# Patient Record
Sex: FEMALE | NOT HISPANIC OR LATINO | Employment: PART TIME | URBAN - METROPOLITAN AREA
[De-identification: names, ages, dates, MRNs, and addresses within clinical notes are randomized per-mention and may not be internally consistent; named-entity substitution may affect disease eponyms.]

---

## 2021-11-10 ENCOUNTER — MEDICAL CORRESPONDENCE (OUTPATIENT)
Dept: HEALTH INFORMATION MANAGEMENT | Facility: CLINIC | Age: 36
End: 2021-11-10

## 2021-11-10 ENCOUNTER — HOSPITAL ENCOUNTER (OUTPATIENT)
Facility: CLINIC | Age: 36
Setting detail: OBSERVATION
Discharge: OTHER INSTITUTION | End: 2021-11-12
Attending: EMERGENCY MEDICINE | Admitting: EMERGENCY MEDICINE

## 2021-11-10 DIAGNOSIS — F12.20 CANNABIS DEPENDENCE (H): ICD-10-CM

## 2021-11-10 DIAGNOSIS — R45.851 SUICIDAL IDEATION: ICD-10-CM

## 2021-11-10 DIAGNOSIS — N30.00 ACUTE CYSTITIS WITHOUT HEMATURIA: ICD-10-CM

## 2021-11-10 DIAGNOSIS — F15.90 STIMULANT USE DISORDER: ICD-10-CM

## 2021-11-10 DIAGNOSIS — F20.0 PARANOID SCHIZOPHRENIA (H): Primary | ICD-10-CM

## 2021-11-10 LAB
ALBUMIN UR-MCNC: NEGATIVE MG/DL
AMPHETAMINES UR QL SCN: ABNORMAL
APPEARANCE UR: CLEAR
BARBITURATES UR QL: ABNORMAL
BENZODIAZ UR QL: ABNORMAL
BILIRUB UR QL STRIP: NEGATIVE
CANNABINOIDS UR QL SCN: ABNORMAL
COCAINE UR QL: ABNORMAL
COLOR UR AUTO: ABNORMAL
GLUCOSE UR STRIP-MCNC: NEGATIVE MG/DL
HCG UR QL: NEGATIVE
HGB UR QL STRIP: NEGATIVE
KETONES UR STRIP-MCNC: NEGATIVE MG/DL
LEUKOCYTE ESTERASE UR QL STRIP: ABNORMAL
MUCOUS THREADS #/AREA URNS LPF: PRESENT /LPF
NITRATE UR QL: NEGATIVE
OPIATES UR QL SCN: ABNORMAL
PCP UR QL SCN: ABNORMAL
PH UR STRIP: 6 [PH] (ref 5–7)
RBC URINE: 1 /HPF
SARS-COV-2 RNA RESP QL NAA+PROBE: NEGATIVE
SP GR UR STRIP: 1.03 (ref 1–1.03)
SQUAMOUS EPITHELIAL: 2 /HPF
UROBILINOGEN UR STRIP-MCNC: NORMAL MG/DL
WBC URINE: 36 /HPF

## 2021-11-10 PROCEDURE — 80307 DRUG TEST PRSMV CHEM ANLYZR: CPT | Performed by: EMERGENCY MEDICINE

## 2021-11-10 PROCEDURE — 87635 SARS-COV-2 COVID-19 AMP PRB: CPT | Performed by: EMERGENCY MEDICINE

## 2021-11-10 PROCEDURE — 99285 EMERGENCY DEPT VISIT HI MDM: CPT | Mod: 25

## 2021-11-10 PROCEDURE — 87086 URINE CULTURE/COLONY COUNT: CPT | Performed by: EMERGENCY MEDICINE

## 2021-11-10 PROCEDURE — C9803 HOPD COVID-19 SPEC COLLECT: HCPCS

## 2021-11-10 PROCEDURE — 250N000013 HC RX MED GY IP 250 OP 250 PS 637: Performed by: EMERGENCY MEDICINE

## 2021-11-10 PROCEDURE — 81001 URINALYSIS AUTO W/SCOPE: CPT | Performed by: EMERGENCY MEDICINE

## 2021-11-10 PROCEDURE — 90791 PSYCH DIAGNOSTIC EVALUATION: CPT

## 2021-11-10 PROCEDURE — 81025 URINE PREGNANCY TEST: CPT | Performed by: EMERGENCY MEDICINE

## 2021-11-10 RX ORDER — CEPHALEXIN 500 MG/1
500 CAPSULE ORAL 2 TIMES DAILY
Status: DISCONTINUED | OUTPATIENT
Start: 2021-11-10 | End: 2021-11-12 | Stop reason: HOSPADM

## 2021-11-10 RX ORDER — CEPHALEXIN 500 MG/1
500 CAPSULE ORAL ONCE
Status: COMPLETED | OUTPATIENT
Start: 2021-11-10 | End: 2021-11-10

## 2021-11-10 RX ORDER — OLANZAPINE 5 MG/1
5 TABLET, ORALLY DISINTEGRATING ORAL ONCE
Status: COMPLETED | OUTPATIENT
Start: 2021-11-10 | End: 2021-11-10

## 2021-11-10 RX ORDER — OLANZAPINE 5 MG/1
5 TABLET, ORALLY DISINTEGRATING ORAL EVERY 8 HOURS PRN
Status: DISCONTINUED | OUTPATIENT
Start: 2021-11-10 | End: 2021-11-10

## 2021-11-10 RX ADMIN — CEPHALEXIN 500 MG: 500 CAPSULE ORAL at 20:58

## 2021-11-10 RX ADMIN — OLANZAPINE 5 MG: 5 TABLET, ORALLY DISINTEGRATING ORAL at 20:58

## 2021-11-10 RX ADMIN — CEPHALEXIN 500 MG: 500 CAPSULE ORAL at 22:58

## 2021-11-10 ASSESSMENT — ENCOUNTER SYMPTOMS: HALLUCINATIONS: 1

## 2021-11-10 ASSESSMENT — MIFFLIN-ST. JEOR: SCORE: 1462.48

## 2021-11-10 NOTE — ED PROVIDER NOTES
History   Chief Complaint:  Psychiatric Evaluation and Suicidal       The history is provided by the patient.      Kylie Meier is a 35 year old female with history of bipolar disorder and schizophrenia who presents with hallucinations and suicidal ideations. The patient states she was just discharged from a hospital after being there for 3 days. She notes that while she was there she was being treated for a possible bladder infection but did not feel safe there. She also states she was sleeping on the floor and everyone was looking at her weird. After the hospital discharged her, she called the police because she did not feel safe and had been unable to get in contact with her family. For the past month, she has been off her medications as she thought she would be okay without them; however, she feels she has been progressively worsening. While in the ED, she states she sees dark shadowy figures and hears voices in her head. She also notes she has been feeling suicidal and does not feel safe being alone.     Review of Systems   Genitourinary: Positive for pelvic pain (bladder).   Psychiatric/Behavioral: Positive for hallucinations and suicidal ideas.   All other systems reviewed and are negative.      Allergies:  The patient has no known allergies.     Medications:  Trazodone   Depakote     Past Medical History:     Bipolar disorder  Schizophrenia    Past Surgical History:    The patient has no listed past surgical history.     Family History:    The patient has no listed family history.     Social History:  The patient presents to the ED alone. She lives with her mother and moved to [place] one year and four months ago.     Physical Exam     Patient Vitals for the past 24 hrs:   BP Temp Temp src Pulse Resp SpO2   11/10/21 1652 126/73 -- -- -- -- --   11/10/21 1650 -- 98.1  F (36.7  C) Temporal 100 18 100 %       Physical Exam  Eye:  Pupils are equal, round, and reactive.  Extraocular movements intact.    ENT:  No  "rhinorrhea.  Moist mucus membranes.  Normal tongue and tonsil.    Cardiac:  Regular rate and rhythm.  No murmurs, gallops, or rubs.    Pulmonary:  Clear to auscultation bilaterally.  No wheezes, rales, or rhonchi.    Abdomen:  Positive bowel sounds.  Abdomen is soft and non-distended, without focal tenderness.    Musculoskeletal:  Normal movement of all extremities without evidence for deficit.    Skin:  Warm and dry without rashes.    Neurologic:  Non-focal exam without asymmetric weakness or numbness.     Psychiatric:  Patient is paranoid, frequently looking out the window with fears of being followed. She describes dark shadows in the corners. She admits to mental health issues and non compliance with meds. She admits to being suicidal.       Emergency Department Course   Laboratory:  UA with microscopic: small leukocyte esterase, mucus present, WBC 36 (H), squamous epithelial 2 (H) o/w WNL     Drug abuse screen 77 urine: positive for amphetamine, cannabinoids, and cocaine o/w all negative      HCG qualitative urine: negative     Asymptomatic COVID19 Virus PCR by nasopharyngeal swab: negative    Emergency Department Course:  Reviewed:  I reviewed nursing notes, vitals, past medical history, Care Everywhere and [facility]    Assessments:  1828 I obtained history and examined the patient as noted above.     1940 I rechecked the patient and explained findings.     Interventions:  2058 Keflex 500 mg PO   Zyprexa 5 mg PO    Disposition:  The patient was transferred to [facility].     Impression & Plan     Medical Decision Making:  This 35-year-old woman presents to us showing signs of paranoia and agitation along with voicing concerns of suicidality.  The patient states that she has been in the [place] for greater than a year and has undergone prior admissions for \"paranoid schizophrenia\" and \"bipolar\" though I cannot find any evidence of visits under her name in care everywhere.  She denies any medical concerns today.  She " "notes that she is supposed to be taking Depakote and trazodone, though she has not been taking these for over a year.  When I mention to Zyprexa she notes that she has been prescribed this in the past with good results.    On my initial evaluation, the patient is having visual hallucinations, seeing \"dark shadows in the corners\" and having paranoia and believing that other patients outside of her room have been sent here to watch her and stalked her.  However, she is redirectable and is calm and polite with me, asking for help to get started back on her medications and to be kept in a safe location.  She tells me that she was at a prior hospital today and does have a prescription for Keflex in her possession, though it is unclear which hospital she was at and she states that \"they just kicked me out.\"  I obtained another urine here which does show a mild infection and her Keflex was continued.  She also agreed to take a dose of Zyprexa with her loose Nations and paranoia.  U tox is positive for methamphetamines and cocaine and it is unclear how much of her symptoms today are related to illicit drug use.  Nonetheless, she is inappropriate to be discharged to the street at this time.  With her negative Covid test, I feel that she is not an ideal candidate for the EMPATH unit where she can be allowed to sober, reinitiate medications, and determine a long-term plan for her psychiatric conditions.  I do not feel she requires further medical work-up at this juncture.    Diagnosis:    ICD-10-CM    1. Paranoia (H)  F22    2. Acute cystitis without hematuria  N30.00    3. Polysubstance abuse (H)  F19.10          Scribe Disclosure:  I, Pauline Crowder, am serving as a scribe at 5:08 PM on 11/10/2021 to document services personally performed by Trierweiler, Chad A, MD based on my observations and the provider's statements to me.              Trierweiler, Chad A, MD  11/10/21 2999    "
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
## 2021-11-10 NOTE — ED TRIAGE NOTES
Pt reports feeling suicidal with plan to jump into traffic. Pt also reports seeing things and hearing things. Patient reports she is seeing demons. Pt brought to ED by police. Pt seen at [facility] today and diagnosed with UTI, has not filled her keflex prescription yet, requesting a dose here due to bladder discomfort.

## 2021-11-11 PROBLEM — F20.0 PARANOID SCHIZOPHRENIA (H): Status: ACTIVE | Noted: 2021-11-11

## 2021-11-11 PROBLEM — N30.00 ACUTE CYSTITIS WITHOUT HEMATURIA: Status: ACTIVE | Noted: 2021-11-11

## 2021-11-11 PROBLEM — F12.20 CANNABIS DEPENDENCE (H): Status: ACTIVE | Noted: 2021-11-11

## 2021-11-11 PROBLEM — R45.851 SUICIDAL IDEATION: Status: ACTIVE | Noted: 2021-11-11

## 2021-11-11 PROBLEM — F15.90 STIMULANT USE DISORDER: Status: ACTIVE | Noted: 2021-11-11

## 2021-11-11 PROCEDURE — G0378 HOSPITAL OBSERVATION PER HR: HCPCS

## 2021-11-11 PROCEDURE — 99220 PR INITIAL OBSERVATION CARE,LEVEL III: CPT | Mod: GC | Performed by: PSYCHIATRY & NEUROLOGY

## 2021-11-11 PROCEDURE — 250N000013 HC RX MED GY IP 250 OP 250 PS 637: Performed by: PSYCHIATRY & NEUROLOGY

## 2021-11-11 PROCEDURE — 250N000013 HC RX MED GY IP 250 OP 250 PS 637: Performed by: EMERGENCY MEDICINE

## 2021-11-11 RX ORDER — HYDROXYZINE HYDROCHLORIDE 50 MG/1
50 TABLET, FILM COATED ORAL 3 TIMES DAILY PRN
Status: DISCONTINUED | OUTPATIENT
Start: 2021-11-11 | End: 2021-11-12 | Stop reason: HOSPADM

## 2021-11-11 RX ORDER — OLANZAPINE 5 MG/1
5 TABLET, ORALLY DISINTEGRATING ORAL EVERY 6 HOURS PRN
Status: DISCONTINUED | OUTPATIENT
Start: 2021-11-11 | End: 2021-11-11

## 2021-11-11 RX ORDER — TRAZODONE HYDROCHLORIDE 100 MG/1
100 TABLET ORAL
Status: DISCONTINUED | OUTPATIENT
Start: 2021-11-11 | End: 2021-11-12 | Stop reason: HOSPADM

## 2021-11-11 RX ORDER — OLANZAPINE 7.5 MG/1
15 TABLET, FILM COATED ORAL ONCE
Status: COMPLETED | OUTPATIENT
Start: 2021-11-11 | End: 2021-11-11

## 2021-11-11 RX ORDER — DIVALPROEX SODIUM 500 MG/1
1000 TABLET, EXTENDED RELEASE ORAL ONCE
Status: COMPLETED | OUTPATIENT
Start: 2021-11-11 | End: 2021-11-11

## 2021-11-11 RX ORDER — OLANZAPINE 10 MG/1
10 TABLET, ORALLY DISINTEGRATING ORAL EVERY 6 HOURS PRN
Status: DISCONTINUED | OUTPATIENT
Start: 2021-11-11 | End: 2021-11-12 | Stop reason: HOSPADM

## 2021-11-11 RX ORDER — ACETAMINOPHEN 500 MG
500 TABLET ORAL EVERY 6 HOURS PRN
Status: DISCONTINUED | OUTPATIENT
Start: 2021-11-11 | End: 2021-11-12 | Stop reason: HOSPADM

## 2021-11-11 RX ADMIN — DIVALPROEX SODIUM 1000 MG: 500 TABLET, FILM COATED, EXTENDED RELEASE ORAL at 14:57

## 2021-11-11 RX ADMIN — OLANZAPINE 5 MG: 5 TABLET, ORALLY DISINTEGRATING ORAL at 13:50

## 2021-11-11 RX ADMIN — CEPHALEXIN 500 MG: 500 CAPSULE ORAL at 09:42

## 2021-11-11 RX ADMIN — CEPHALEXIN 500 MG: 500 CAPSULE ORAL at 21:08

## 2021-11-11 RX ADMIN — OLANZAPINE 15 MG: 7.5 TABLET, FILM COATED ORAL at 14:57

## 2021-11-11 RX ADMIN — ACETAMINOPHEN 500 MG: 500 TABLET, FILM COATED ORAL at 14:57

## 2021-11-11 ASSESSMENT — ACTIVITIES OF DAILY LIVING (ADL): HYGIENE/GROOMING: INDEPENDENT

## 2021-11-11 NOTE — ED PROVIDER NOTES
EmPATH Unit - Initial Psychiatric Observation Note  [facility] Emergency Department  Observation Initiation Date: Nov 10, 2021    Kylie Meier MRN: [id number]   Age: 35 year old YOB: 1985     History     Chief Complaint   Patient presents with     Psychiatric Evaluation     Suicidal     HPI  Kylie Meier is a 35 year old female with a past history notable for schizophrenia and substance use disorders who presented to the emergency room reporting worsening hallucinations and suicidal ideation.  Her urine drug screen was positive for cocaine, amphetamines, and cannabis.  She reported nonadherence with her psychotropic medications for approximately 1 month.  She was noted to also have a urinary tract infection and was continued on Keflex.  She was transferred to the empath unit for psychiatric assessment.  On examination, the patient confirmed nonadherence with her medications over the past 1 month as she thought she no longer needed them.  She suspects she was taking a combination of Depakote, Risperdal, Zyprexa, and trazodone however was not certain.  She endorsed recent substance usage, primarily discussing using K2 which resulted in significant worsening of auditory and visual hallucinations.  She also noted significant paranoid delusions leading to a state of agitation and wanting to confront random people in the community.  As she did not desire the conflict, she contemplated suicide however came to the emergency room to seek help instead.  She was able to contract for safety on the unit.  She is open to the treatment plan as outlined below.    Past Medical History  History reviewed. No pertinent past medical history.  History reviewed. No pertinent surgical history.  No current outpatient medications on file.    Allergies   Allergen Reactions     Iodine Rash     Family History  History reviewed. No pertinent family history.  Social History   Social History     Tobacco Use     Smoking  "status: Current Every Day Smoker     Smokeless tobacco: Never Used   Substance Use Topics     Alcohol use: Not Currently     Drug use: Yes     Types: Marijuana      Past medical history, past surgical history, medications, allergies, family history, and social history were reviewed with the patient. No additional pertinent items.       Review of Systems  A complete review of systems was performed with pertinent positives and negatives noted in the HPI, and all other systems negative.    Physical Examination   BP: 126/73  Pulse: 100  Temp: 98.1  F (36.7  C)  Resp: 18  Height: 160 cm (5' 3\")  Weight: 78.8 kg (173 lb 12.8 oz)  SpO2: 100 %    Physical Exam  General: Appears stated age.   Neuro: Alert and fully oriented. Extremities appear to demonstrate normal strength on visual inspection.   Integumentary/Skin: no rash visualized, normal color    Psychiatric Examination   Appearance: dressed in hospital scrubs and fatigued  Attitude:  guarded and somewhat cooperative  Eye Contact:  poor   Mood:  angry, anxious and sad   Affect:  labile and guarded  Speech:  clear, coherent  Psychomotor Behavior:  Rocking back and forth while laying on a mat on the floor.  Thought Process:  linear  Associations:  no loose associations  Thought Content:  active suicidal ideation present, auditory hallucinations present and Paranoid delusions noted  Insight:  partial  Judgement:  fair  Oriented to:  time, person, and place  Attention Span and Concentration:  limited  Recent and Remote Memory:  fair  Language: able to name/identify objects without impairment  Fund of Knowledge: intact with awareness of current and past events    ED Course        Labs Ordered and Resulted from Time of ED Arrival to Time of ED Departure   ROUTINE UA WITH MICROSCOPIC REFLEX TO CULTURE - Abnormal       Result Value    Color Urine Light Yellow      Appearance Urine Clear      Glucose Urine Negative      Bilirubin Urine Negative      Ketones Urine Negative      " Specific Gravity Urine 1.029      Blood Urine Negative      pH Urine 6.0      Protein Albumin Urine Negative      Urobilinogen Urine Normal      Nitrite Urine Negative      Leukocyte Esterase Urine Small (*)     Mucus Urine Present (*)     RBC Urine 1      WBC Urine 36 (*)     Squamous Epithelials Urine 2 (*)    DRUG ABUSE SCREEN 77 URINE (FL, RH, SH) - Abnormal    Amphetamines Urine Screen Positive (*)     Barbiturates Urine Screen Negative      Benzodiazepines Urine Screen Negative      Cannabinoids Urine Screen Positive (*)     Cocaine Urine Screen Positive (*)     Opiates Urine Screen Negative      PCP Urine Screen Negative     COVID-19 VIRUS (CORONAVIRUS) BY PCR - Normal    SARS CoV2 PCR Negative     HCG QUALITATIVE URINE - Normal    hCG Urine Qualitative Negative     URINE CULTURE    Culture Culture in progress         Assessments & Plan (with Medical Decision Making)   Patient presenting with decompensation of her psychotic illness in the setting of illicit substance usage and nonadherence with medications leading to increased psychosis and suicidal thoughts. Nursing notes reviewed noting no acute issues.     I have reviewed the assessment completed by the [facility].     During the observation period, the patient did not require medications for agitation, and did not require restraints/seclusion for patient and/or provider safety.     The patient was found to have a psychiatric condition that would benefit from an observation stay in the emergency department for further psychiatric stabilization and/or coordination of a safe disposition. The observation plan includes serial assessments of psychiatric condition, potential administration of medications if indicated, further disposition pending the patient's psychiatric course during the monitoring period.     Preliminary diagnosis:    ICD-10-CM    1. Paranoid schizophrenia (H)  F20.0    2. Acute cystitis without hematuria  N30.00    3. Cannabis dependence (H)   F12.20    4. Stimulant use disorder  F15.90    5. Suicidal ideation  R45.851         Treatment Plan:  -Begin a combination of Zyprexa and Depakote targeting affective instability and psychosis.  Depakote will be initiated at 1000 mg daily in addition to Zyprexa 15 mg at bedtime.  Risks and benefits of her medication were reviewed.  -Trazodone will be available as needed for insomnia.  Vistaril will be available as needed for anxiety.  -Labs were reviewed including a urine drug screen that was positive for cocaine, amphetamines, and cannabis.  -Keflex has been continued for treatment of UTI  -Enter to observation status and plan to reassess tomorrow.    --  Alfredo Jennings MD   [facility] EMERGENCY DEPT  EmPATH Unit  11/10/2021        Alfredo Jennings MD  11/11/21 1500       Alfredo Jennings MD  11/11/21 6142
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
## 2021-11-11 NOTE — ED NOTES
Pt in sensory room resting on mat. She is pleasant and cooperative when interacting with staff. Pt talked to staff about seeing aliens. Pt med compliant. She reports pain and odor when urinating secondary to her urinary infection.

## 2021-11-11 NOTE — PLAN OF CARE
"Pt received from ED for hallucinations and suicidal ideation. On EmPATH, Pt reports she has been suicidal and was having suicidal thoughts today. She states her plan is \"to let the evil spirits and people who are going to kill me kill me.\" To jump off the building.Pt reports previous suicide attempts but could not provide any details. She reports hearing voices telling her \"Don't trust, be aware.\" Pt reports she used to take Depakote, Trazodone and Celexa but has not done so in a month. Pt could not provide details about medication dosages, her doctor or clinic she went to. Pt reports yesterday she smoked marijuana and thinks it was laced by cocaine and possibly meth. She does not admit to using cocaine or amphetamines. Pt presents as paranoid and inquired about the cartels and whether the unit was safe.     Nursing and risk assessments completed.  Assessments reviewed with [facility] and physician. Video monitoring in progress, patient informed.  Admission information reviewed with patient. Patient given a tour of EmPATH and instructions on using the facility. Questions regarding EmPATH addressed. Pt search completed and belongings inventoried.   "
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
## 2021-11-11 NOTE — ED NOTES
The pt took the antibiotic late after having breakfast and feeling less nauseated.   The pt states that she needs to hide under the covers because of the ghosts. She states that she knows that they're there because they have been changing the lights in the room.

## 2021-11-11 NOTE — TREATMENT PLAN
"  [facility] Treatment Plan     Client's Name: \"Tray\" Kylie Meier                    YOB: 1985     DSM-5 Diagnoses:     F20.9   Schizophrenia, by history                     F31.9 Unspecified bipolar and related disorder, by history            Psychosocial / Contextual Factors:      Patient is a 35 year old,  female ( uses she/her pronouns, but she does not identify as feminine and would like to be called \"Tray\") who presents to the ED for concerns of hallucinations, suicidal ideations, and paranoia.     Pt presents with a depressed mood and disorganized thinking and speech, frequently derailing with paranoid comments. Pt appears paranoid about staff members and patients on the unit. Pt appears to be religiously preoccupied. Pt endorses command hallucinations.    Anticipated number of sessions or this episode of care: 1-4     MeasurableTreatment Goal(s) related to diagnosis / functional impairment(s)     Goal 1: Patient will increase coping skills in response to hallucinations   Objective #A    Patient will identify cognitive distortions associated with the hallucinations.   Intervention(s)   LMHP will focus on maladaptive beliefs and use CBT methods to help patient to attempt responding to them rationally.   Objective #B   Patient will identify distraction and grounding skills to help with hallucinations.   Intervention(s)   LMHP will provide psychoeducation and guide practicing skills.          Goal 2: Stabilize the suicidal crisis    Objective: Identify life factors/triggers that preceded the SI    Patient will: express feelings related to SI in order to explore factors/triggers    LMHP will: assist patient in becoming aware of life factors and triggers that may have been   precursors to SI    Objective: participate in therapy session around emotional issues resulting in suicidal thoughts    Patient will: discuss feelings and emotional sources of stress, hopelessness    LMHP will: " encourage pt to express feelings and emotions                              Appearance:                            Appropriate               Eye Contact:                           Good               Psychomotor Behavior:          Normal               Attitude:                                   Cooperative               Orientation:                             All              Speech                          Rate / Production:       Normal/ Responsive                          Volume:                       Normal               Mood:                                      Normal              Affect:                                      Appropriate               Thought Content:                    Hallucinations and paranoia              Thought Form:                        Loose associations and tangential              Insight:                                     Appropriate to the situation              PLAN:   [facility] and treatment deem it appropriate to either discharge or admit to a higher level of care.     Due to patient's psychotic features including command hallucinations, paranoia, suicidal ideation, and inability to feel unsafe, [facility]. Pt is in agreement to stay overnight and speak with a psychiatrist in the morning. Pt reports feeling calm after assessment (Pt took Zyprexa prior to assessment) and can contract for safety at [facility]. Pt is calm, cooperative, pleasant, and appreciative of psychotherapy session.     Consult is still in process at the time of writing this note. Information from this assessment will be communicated to the attending provider. Determination of disposition in process, LM[facility] team will update as disposition is finalized. See assessment for further information.     Giuliano Dickson

## 2021-11-11 NOTE — ED NOTES
"Pt had been sleeping in [facility]. Pt woke up and stated that she felt nauseated and had back pain, states \"probably due to the UTI\".   "

## 2021-11-12 ENCOUNTER — HOSPITAL ENCOUNTER (INPATIENT)
Facility: CLINIC | Age: 36
LOS: 6 days | Discharge: NURSING, REHAB OR LONG-TERM CARE | DRG: 885 | End: 2021-11-18
Attending: PSYCHIATRY & NEUROLOGY | Admitting: PSYCHIATRY & NEUROLOGY

## 2021-11-12 ENCOUNTER — TELEPHONE (OUTPATIENT)
Dept: BEHAVIORAL HEALTH | Facility: CLINIC | Age: 36
End: 2021-11-12

## 2021-11-12 VITALS
HEIGHT: 63 IN | WEIGHT: 173.8 LBS | DIASTOLIC BLOOD PRESSURE: 81 MMHG | RESPIRATION RATE: 16 BRPM | OXYGEN SATURATION: 98 % | BODY MASS INDEX: 30.79 KG/M2 | HEART RATE: 109 BPM | TEMPERATURE: 97.9 F | SYSTOLIC BLOOD PRESSURE: 128 MMHG

## 2021-11-12 DIAGNOSIS — F25.1 SCHIZOAFFECTIVE DISORDER, DEPRESSIVE TYPE (H): ICD-10-CM

## 2021-11-12 DIAGNOSIS — F29 PSYCHOSIS, UNSPECIFIED PSYCHOSIS TYPE (H): Primary | ICD-10-CM

## 2021-11-12 LAB — BACTERIA UR CULT: ABNORMAL

## 2021-11-12 PROCEDURE — 124N000002 HC R&B MH UMMC

## 2021-11-12 PROCEDURE — 250N000013 HC RX MED GY IP 250 OP 250 PS 637: Performed by: PSYCHIATRY & NEUROLOGY

## 2021-11-12 PROCEDURE — G0378 HOSPITAL OBSERVATION PER HR: HCPCS

## 2021-11-12 PROCEDURE — 250N000013 HC RX MED GY IP 250 OP 250 PS 637: Performed by: EMERGENCY MEDICINE

## 2021-11-12 PROCEDURE — 99217 PR OBSERVATION CARE DISCHARGE: CPT | Performed by: PSYCHIATRY & NEUROLOGY

## 2021-11-12 RX ORDER — HYDROXYZINE HYDROCHLORIDE 25 MG/1
25 TABLET, FILM COATED ORAL EVERY 4 HOURS PRN
Status: DISCONTINUED | OUTPATIENT
Start: 2021-11-12 | End: 2021-11-18 | Stop reason: HOSPADM

## 2021-11-12 RX ORDER — OLANZAPINE 10 MG/1
10 TABLET ORAL 2 TIMES DAILY
Status: DISCONTINUED | OUTPATIENT
Start: 2021-11-12 | End: 2021-11-18 | Stop reason: HOSPADM

## 2021-11-12 RX ORDER — DIVALPROEX SODIUM 500 MG/1
1000 TABLET, EXTENDED RELEASE ORAL DAILY
Status: DISCONTINUED | OUTPATIENT
Start: 2021-11-13 | End: 2021-11-18 | Stop reason: HOSPADM

## 2021-11-12 RX ORDER — OLANZAPINE 10 MG/2ML
10 INJECTION, POWDER, FOR SOLUTION INTRAMUSCULAR 3 TIMES DAILY PRN
Status: DISCONTINUED | OUTPATIENT
Start: 2021-11-12 | End: 2021-11-18 | Stop reason: HOSPADM

## 2021-11-12 RX ORDER — ACETAMINOPHEN 325 MG/1
650 TABLET ORAL EVERY 4 HOURS PRN
Status: DISCONTINUED | OUTPATIENT
Start: 2021-11-12 | End: 2021-11-18 | Stop reason: HOSPADM

## 2021-11-12 RX ORDER — CEPHALEXIN 500 MG/1
500 CAPSULE ORAL 2 TIMES DAILY
Status: DISCONTINUED | OUTPATIENT
Start: 2021-11-13 | End: 2021-11-16

## 2021-11-12 RX ORDER — OLANZAPINE 10 MG/1
10 TABLET ORAL 2 TIMES DAILY
Status: ON HOLD | COMMUNITY
End: 2021-11-18

## 2021-11-12 RX ORDER — CEPHALEXIN 500 MG/1
500 CAPSULE ORAL 2 TIMES DAILY
Status: ON HOLD | COMMUNITY
End: 2021-11-18

## 2021-11-12 RX ORDER — MAGNESIUM HYDROXIDE/ALUMINUM HYDROXICE/SIMETHICONE 120; 1200; 1200 MG/30ML; MG/30ML; MG/30ML
30 SUSPENSION ORAL EVERY 4 HOURS PRN
Status: DISCONTINUED | OUTPATIENT
Start: 2021-11-12 | End: 2021-11-18 | Stop reason: HOSPADM

## 2021-11-12 RX ORDER — OLANZAPINE 10 MG/1
10 TABLET ORAL 3 TIMES DAILY PRN
Status: DISCONTINUED | OUTPATIENT
Start: 2021-11-12 | End: 2021-11-18 | Stop reason: HOSPADM

## 2021-11-12 RX ORDER — DIVALPROEX SODIUM 500 MG/1
1000 TABLET, EXTENDED RELEASE ORAL DAILY
Status: ON HOLD | COMMUNITY
End: 2021-11-18

## 2021-11-12 RX ORDER — TRAZODONE HYDROCHLORIDE 50 MG/1
50 TABLET, FILM COATED ORAL
Status: DISCONTINUED | OUTPATIENT
Start: 2021-11-12 | End: 2021-11-18 | Stop reason: HOSPADM

## 2021-11-12 RX ORDER — AMOXICILLIN 250 MG
1 CAPSULE ORAL 2 TIMES DAILY PRN
Status: DISCONTINUED | OUTPATIENT
Start: 2021-11-12 | End: 2021-11-18 | Stop reason: HOSPADM

## 2021-11-12 RX ADMIN — CEPHALEXIN 500 MG: 500 CAPSULE ORAL at 20:38

## 2021-11-12 RX ADMIN — OLANZAPINE 10 MG: 10 TABLET, FILM COATED ORAL at 22:29

## 2021-11-12 RX ADMIN — CEPHALEXIN 500 MG: 500 CAPSULE ORAL at 08:02

## 2021-11-12 ASSESSMENT — ACTIVITIES OF DAILY LIVING (ADL)
CONCENTRATING,_REMEMBERING_OR_MAKING_DECISIONS_DIFFICULTY: NO
TOILETING_ISSUES: NO
DOING_ERRANDS_INDEPENDENTLY_DIFFICULTY: NO
HEARING_DIFFICULTY_OR_DEAF: NO
WALKING_OR_CLIMBING_STAIRS_DIFFICULTY: NO
HYGIENE/GROOMING: INDEPENDENT
DRESSING/BATHING_DIFFICULTY: NO
FALL_HISTORY_WITHIN_LAST_SIX_MONTHS: NO
DRESS: INDEPENDENT
DRESS: SCRUBS (BEHAVIORAL HEALTH)
ORAL_HYGIENE: INDEPENDENT
WEAR_GLASSES_OR_BLIND: NO
PATIENT_/_FAMILY_COMMUNICATION_STYLE: SPOKEN LANGUAGE (ENGLISH OR BILINGUAL)
DIFFICULTY_EATING/SWALLOWING: NO
DIFFICULTY_COMMUNICATING: NO
LAUNDRY: WITH SUPERVISION

## 2021-11-12 ASSESSMENT — MIFFLIN-ST. JEOR: SCORE: 1384.01

## 2021-11-12 NOTE — TELEPHONE ENCOUNTER
S:  3:15 PM  Call from [facility] unit requesting IP MH placement for a 36 YO F    B:  Hx of schizophrenia, PTSD  Reports she has been off her meds for one month Patient has been in the [facility] unit for 2 days, meds restarted, but still having  AH and VH  and paranoia AH are distracting and telling her to kill herself by jumping off of a building and she doesn't feel safe returning home yet . no hx of aggression, denies any medical problems      Utox POS for amphetamines/cocaine and cannabinoid     A:  vol    R:  Patient cleared and ready for behavioral bed placement: Yes

## 2021-11-12 NOTE — PROGRESS NOTES
15 minute checks and video monitoring in progress, pt continues to be asleep in Sensory room, resp reg and no sign of distress.

## 2021-11-12 NOTE — ED NOTES
[facility] Group Note    Kylie Meier was asleep at the time of morning group, and therefore did not participate.     Olivia William, LICSW

## 2021-11-12 NOTE — ED NOTES
Pt resting on the mat in the sensory room. Pt reports she is doing good and did not have any concerns at this moment. She is waiting for bed placement and inquired if we knew where she was going yet.

## 2021-11-12 NOTE — ED NOTES
Pt is awake and isolative in the sensory room, irritable and refused vital signs. Pt is unkempt, deshelved with poor insight into illness, irritable affect, and impaired judgement.

## 2021-11-12 NOTE — ED PROVIDER NOTES
"EmPATH Unit - Psychiatric Observation Discharge Summary  [facility] Emergency Department  Discharge Date: 11/12/2021    Kylie Meier MRN: [id number]   Age: 35 year old YOB: 1985     Brief HPI & Initial ED Course     Chief Complaint   Patient presents with     Psychiatric Evaluation     Suicidal     HPI  Kylie Mieer is a 35 year old female with a past history notable for schizophrenia and substance use disorders who presented to the emergency room reporting worsening hallucinations and suicidal ideation.  Her urine drug screen was positive for cocaine, amphetamines, and cannabis.  She reported nonadherence with her psychotropic medications for approximately 1 month.  She was noted to also have a urinary tract infection and was continued on Keflex.  She was transferred to the empath unit for psychiatric assessment.  She entered observation status while resuming Zyprexa and Depakote targeting mood stabilization and reduction of psychosis.  She is being reassessed today.  The patient continues to report high intensity of auditory hallucinations.  Paranoid delusions remain present.  She does not feel safe discharging from the hospital today.  She is seeking inpatient psychiatric hospitalization.        Physical Examination   BP: 110/77  Pulse: 105  Temp: 98.5  F (36.9  C)  Resp: 16  Height: 160 cm (5' 3\")  Weight: 78.8 kg (173 lb 12.8 oz)  SpO2: 99 %    Physical Exam  General: Appears stated age.   Neuro: Alert and fully oriented. Extremities appear to demonstrate normal strength on visual inspection.   Integumentary/Skin: no rash visualized, normal color    Psychiatric Examination   Appearance: fatigued and slightly unkempt  Attitude:  guarded and somewhat cooperative  Eye Contact:  fair  Mood:  angry, anxious and sad   Affect:  labile and guarded  Speech:  clear, coherent  Psychomotor Behavior:  fidgeting  Thought Process:  linear  Associations:  no loose associations  Thought Content:  no " evidence of suicidal ideation or homicidal ideation, auditory hallucinations present and Paranoia is evident  Insight:  partial  Judgement:  fair  Oriented to:  time, person, and place  Attention Span and Concentration:  fair  Recent and Remote Memory:  intact  Language: able to name/identify objects without impairment  Fund of Knowledge: intact with awareness of current and past events    Results        Labs Ordered and Resulted from Time of ED Arrival to Time of ED Departure   ROUTINE UA WITH MICROSCOPIC REFLEX TO CULTURE - Abnormal       Result Value    Color Urine Light Yellow      Appearance Urine Clear      Glucose Urine Negative      Bilirubin Urine Negative      Ketones Urine Negative      Specific Gravity Urine 1.029      Blood Urine Negative      pH Urine 6.0      Protein Albumin Urine Negative      Urobilinogen Urine Normal      Nitrite Urine Negative      Leukocyte Esterase Urine Small (*)     Mucus Urine Present (*)     RBC Urine 1      WBC Urine 36 (*)     Squamous Epithelials Urine 2 (*)    DRUG ABUSE SCREEN 77 URINE (FL, RH, SH) - Abnormal    Amphetamines Urine Screen Positive (*)     Barbiturates Urine Screen Negative      Benzodiazepines Urine Screen Negative      Cannabinoids Urine Screen Positive (*)     Cocaine Urine Screen Positive (*)     Opiates Urine Screen Negative      PCP Urine Screen Negative     URINE CULTURE - Abnormal    Culture 10,000-50,000 CFU/mL Gram negative bacilli (*)    COVID-19 VIRUS (CORONAVIRUS) BY PCR - Normal    SARS CoV2 PCR Negative     HCG QUALITATIVE URINE - Normal    hCG Urine Qualitative Negative         Observation Course   The patient was found to have a psychiatric condition that would benefit from an observation stay in the emergency department for further psychiatric stabilization and/or coordination of a safe disposition. The plan upon observation admission included serial assessments of psychiatric condition, potential administration of medications if  indicated, further disposition pending the patient's psychiatric course during the monitoring period.     Serial assessments of the patient's psychiatric condition were performed. Nursing notes were reviewed. During the observation period, the patient did not require medications for agitation, and did not require restraints/seclusion for patient and/or provider safety.     After a period of working with the treatment team on the EmPATH unit, the patient's mental state improved to allow a safe transition to outpatient care. After counseling on the diagnosis, work-up, and treatment plan, the patient was discharged. Close follow-up with a psychiatrist and/or therapist was recommended and community psychiatric resources were provided. Patient is to return to the ED if any urgent or potentially life-threatening concerns.     Discharge Diagnoses:   Final diagnoses:   Acute cystitis without hematuria   Paranoid schizophrenia (H)   Cannabis dependence (H)   Stimulant use disorder   Suicidal ideation       Treatment Plan:  -Given the ongoing high intensity of symptoms she is experiencing, we will transfer to inpatient psychiatry for continued care.  The patient is agreeable to this plan of care.  Zyprexa and Depakote will be continued for reduction of psychosis and mood stabilization.  Once she is able to better participate in an extensive assessment, she would benefit from a chemical health assessment to help guide substance use disorder treatment options.      --  Alfredo Jennings MD  [facility] EMERGENCY DEPT  EmPATH Unit  11/12/2021      Alfredo Jennings MD  11/12/21 9736
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
## 2021-11-12 NOTE — ED NOTES
Pt spent entire shift in the sensory room sleeping and would not even get out on multiple attempts. Pt is very guarded and does not like answering questions. Pt will remain on Observation and be reassess tomorrow. Ated two meals this evening. Antibiotic given as ordered.

## 2021-11-12 NOTE — ED NOTES
Pt is isolative and avoiding talking to staff. Stays almost entire shift in her room, came out for a snack.

## 2021-11-13 PROCEDURE — 250N000013 HC RX MED GY IP 250 OP 250 PS 637: Performed by: PSYCHIATRY & NEUROLOGY

## 2021-11-13 PROCEDURE — 124N000002 HC R&B MH UMMC

## 2021-11-13 RX ADMIN — DIVALPROEX SODIUM 1000 MG: 500 TABLET, EXTENDED RELEASE ORAL at 09:16

## 2021-11-13 RX ADMIN — OLANZAPINE 10 MG: 10 TABLET, FILM COATED ORAL at 09:16

## 2021-11-13 RX ADMIN — CEPHALEXIN 500 MG: 500 CAPSULE ORAL at 19:46

## 2021-11-13 RX ADMIN — CEPHALEXIN 500 MG: 500 CAPSULE ORAL at 09:16

## 2021-11-13 RX ADMIN — OLANZAPINE 10 MG: 10 TABLET, FILM COATED ORAL at 19:46

## 2021-11-13 ASSESSMENT — ACTIVITIES OF DAILY LIVING (ADL)
LAUNDRY: WITH SUPERVISION
ORAL_HYGIENE: INDEPENDENT
HYGIENE/GROOMING: INDEPENDENT
ORAL_HYGIENE: INDEPENDENT
DRESS: INDEPENDENT
HYGIENE/GROOMING: INDEPENDENT
LAUNDRY: WITH SUPERVISION
DRESS: INDEPENDENT

## 2021-11-13 NOTE — PROGRESS NOTES
Discharge instructions reviewed with patient including follow-up care plan. Educated on medication regime and advised not to stop prescribed medication without consulting their physician. Reviewed safety plan and outpatient resources.Denies SI. All belongings which where brought into the hospital have been returned to patient. Escorted off the unit at 21:00 accompanied by staff and EMS personnel. Discharged to [facility].

## 2021-11-13 NOTE — PROGRESS NOTES
11/12/21 6569   Patient Belongings   Did you bring any home meds/supplements to the hospital?  No   Patient Belongings sent to security per site process;locker   Patient Belongings Put in Hospital Secure Location (Security or Locker, etc.) cash/credit card;cell phone/electronics;clothing;other (see comments)   Belongings Search Yes   Clothing Search Yes   Second Staff Dona VALENTINE (RN)   Locker:  Sandals, jeans, 2 tshirts, bra, sweatshirt w/ strings, 6 paperback books, 3 hardcover books, mask, tissue paper, sex toy, backpack, 2 zip up sweatshirts, 2 pants, thor vest, phone, portable , 2 reward card, loose change, 2 lighters, [employer]. Cell phone #2 found in patient's room.    Security:  Holiday Giftcard *[phone]  Mastercard *[phone]    A               Admission:  I am responsible for any personal items that are not sent to the safe or pharmacy.  [facility] is not responsible for loss, theft or damage of any property in my possession.    Signature:  _________________________________ Date: _______  Time: _____                                              Staff Signature:  ____________________________ Date: ________  Time: _____      2nd Staff person, if patient is unable/unwilling to sign:    Signature: ________________________________ Date: ________  Time: _____     Discharge:  [facility] has returned all of my personal belongings:    Signature: _________________________________ Date: ________  Time: _____                                          Staff Signature:  ____________________________ Date: ________  Time: _____

## 2021-11-13 NOTE — TELEPHONE ENCOUNTER
S:  3:15 PM  Call from  [facility] EmPATH unit requesting IP MH placement for a 34 YO F    B:  Hx of schizophrenia, PTSD  Reports she has been off her meds for one month Patient has been in the EmPATH unit for 2 days, meds restarted, but still having  AH and VH  and paranoia AH are distracting and telling her to kill herself by jumping off of a building and she doesn't feel safe returning home yet . no hx of aggression, denies any medical problems      Utox POS for amphetamines/cocaine and cannabinoid     A:  Vol, medically cleared      R:  Patient cleared and ready for behavioral bed placement: Yes   Pt [facility]   Pt placed in queue   Unit charge busy upon intake calling with disposition, will call intake back.   Unit informed of disposition, will be calling [facility] with report and disposition

## 2021-11-13 NOTE — PLAN OF CARE
" Assessment    Patient is alert and oriented, cooperative. She was tense and irritable. She continues to be very paranoid. She was isolative to her room. Speech is Normal. Patient's insight is Poor. Patient is Disheveled. The patient voices their needs appropriately. Patient endorses passive SI thoughts, depression,anxiety, and auditory hallucinations, but denied SIB, racing thoughts, and HI. Patient has been eating, hydrating, and sleeping adequately. Patient is medication compliant, but needs reminders. Medication side effects were not observed or reported. From what writer could assess, patient's skin is intact, free from injuries or open sores. Plan is to continue to monitor patient status q 15 mins, assess response to medications, and maintain the patients safety.    Temp was slightly low 96.4F, refused recheck. \" doesn't want to be bothered.\"  Denied pain    "

## 2021-11-13 NOTE — H&P
H&P, preliminary    Admission Date: 11/12/2021  Date of service: 11/13/2021    The patient refused to come to interview room and was seen in her room, her chart reviewed, full report to follow.    DX: Schizoaffective Disorder, depressed         R/O Schizophrenia, paranoid type         R/O Substance-induced Psychosis         Polysubstance Abuse         Cystitis    PLAN: Close observation, suicide precautions, Status 15. Continue Zyprexa, Depakote ER and Keflex in the same doses.  Her care will be assumed by Dr. Javed on Monday.    David Thomas MD

## 2021-11-13 NOTE — PLAN OF CARE
NOC Shift Report    Pt in bed at beginning of shift, breathing quiet and unlabored. Pt slept through shift. Pt slept 6 hours.     No pt complaints or concerns at this time.     No PRNs given. Will continue to monitor.

## 2021-11-13 NOTE — ED NOTES
Pt updated on placement at [facility] and estimated EMS  time. Pt expressed understanding and was agreeable.

## 2021-11-13 NOTE — PHARMACY-ADMISSION MEDICATION HISTORY
Admission Medication History Completed by Pharmacy    See [facility] Admission Navigator for allergy information, preferred outpatient pharmacy, prior to admission medications and immunization status.     Medication history sources:  EmPATH Unit Discharge Summary from 11/12; Patient via phone    Pertinent changes made to PTA medication list:  Added: N/A  Deleted: N/A  Changed: N/A    Additional medication history information:   - All of patient's medications [facility]. Prior to that, patient reports she was not on any medications.   - Patient denies taking any additional Rx/OTC medications other than the ones listed below.    Prior to Admission medications    Medication Sig Last Dose Taking? Auth Provider   cephALEXin (KEFLEX) 500 MG capsule Take 500 mg by mouth 2 times daily 11/13/2021 Yes Reported, Patient   divalproex sodium extended-release (DEPAKOTE ER) 500 MG 24 hr tablet Take 1,000 mg by mouth daily 11/13/2021 Yes Reported, Patient   OLANZapine (ZYPREXA) 10 MG tablet Take 10 mg by mouth 2 times daily 11/13/2021 at [place] time Yes Reported, Patient          Date completed: 11/13/21    Medication history completed by:   Danni South, Alondra   [facility] [place]  Emergency Department: Ascom *[phone]
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
## 2021-11-14 LAB
ALBUMIN SERPL-MCNC: 2.9 G/DL (ref 3.4–5)
ALP SERPL-CCNC: 59 U/L (ref 40–150)
ALT SERPL W P-5'-P-CCNC: 20 U/L (ref 0–50)
ANION GAP SERPL CALCULATED.3IONS-SCNC: 4 MMOL/L (ref 3–14)
AST SERPL W P-5'-P-CCNC: 12 U/L (ref 0–45)
BASOPHILS # BLD MANUAL: 0 10E3/UL (ref 0–0.2)
BASOPHILS NFR BLD MANUAL: 0 %
BILIRUB SERPL-MCNC: 0.3 MG/DL (ref 0.2–1.3)
BUN SERPL-MCNC: 20 MG/DL (ref 7–30)
CALCIUM SERPL-MCNC: 8.6 MG/DL (ref 8.5–10.1)
CHLORIDE BLD-SCNC: 104 MMOL/L (ref 94–109)
CHOLEST SERPL-MCNC: 144 MG/DL
CO2 SERPL-SCNC: 28 MMOL/L (ref 20–32)
CREAT SERPL-MCNC: 0.89 MG/DL (ref 0.52–1.04)
EOSINOPHIL # BLD MANUAL: 0.2 10E3/UL (ref 0–0.7)
EOSINOPHIL NFR BLD MANUAL: 3 %
ERYTHROCYTE [DISTWIDTH] IN BLOOD BY AUTOMATED COUNT: 13.6 % (ref 10–15)
GFR SERPL CREATININE-BSD FRML MDRD: 85 ML/MIN/1.73M2
GLUCOSE BLD-MCNC: 79 MG/DL (ref 70–99)
HCT VFR BLD AUTO: 39.2 % (ref 35–47)
HDLC SERPL-MCNC: 54 MG/DL
HGB BLD-MCNC: 12.8 G/DL (ref 11.7–15.7)
LDLC SERPL CALC-MCNC: 75 MG/DL
LYMPHOCYTES # BLD MANUAL: 2.2 10E3/UL (ref 0.8–5.3)
LYMPHOCYTES NFR BLD MANUAL: 44 %
MCH RBC QN AUTO: 31.3 PG (ref 26.5–33)
MCHC RBC AUTO-ENTMCNC: 32.7 G/DL (ref 31.5–36.5)
MCV RBC AUTO: 96 FL (ref 78–100)
MONOCYTES # BLD MANUAL: 0.4 10E3/UL (ref 0–1.3)
MONOCYTES NFR BLD MANUAL: 7 %
NEUTROPHILS # BLD MANUAL: 2.3 10E3/UL (ref 1.6–8.3)
NEUTROPHILS NFR BLD MANUAL: 46 %
NONHDLC SERPL-MCNC: 90 MG/DL
PLAT MORPH BLD: NORMAL
PLATELET # BLD AUTO: 305 10E3/UL (ref 150–450)
POTASSIUM BLD-SCNC: 4.4 MMOL/L (ref 3.4–5.3)
PROT SERPL-MCNC: 6.9 G/DL (ref 6.8–8.8)
RBC # BLD AUTO: 4.09 10E6/UL (ref 3.8–5.2)
RBC MORPH BLD: NORMAL
SODIUM SERPL-SCNC: 136 MMOL/L (ref 133–144)
TRIGL SERPL-MCNC: 73 MG/DL
TSH SERPL DL<=0.005 MIU/L-ACNC: 1.02 MU/L (ref 0.4–4)
WBC # BLD AUTO: 5 10E3/UL (ref 4–11)

## 2021-11-14 PROCEDURE — 80061 LIPID PANEL: CPT | Performed by: PSYCHIATRY & NEUROLOGY

## 2021-11-14 PROCEDURE — 250N000013 HC RX MED GY IP 250 OP 250 PS 637: Performed by: PSYCHIATRY & NEUROLOGY

## 2021-11-14 PROCEDURE — 124N000002 HC R&B MH UMMC

## 2021-11-14 PROCEDURE — 36415 COLL VENOUS BLD VENIPUNCTURE: CPT | Performed by: PSYCHIATRY & NEUROLOGY

## 2021-11-14 PROCEDURE — 80053 COMPREHEN METABOLIC PANEL: CPT | Performed by: PSYCHIATRY & NEUROLOGY

## 2021-11-14 PROCEDURE — 84443 ASSAY THYROID STIM HORMONE: CPT | Performed by: PSYCHIATRY & NEUROLOGY

## 2021-11-14 PROCEDURE — 85027 COMPLETE CBC AUTOMATED: CPT | Performed by: PSYCHIATRY & NEUROLOGY

## 2021-11-14 RX ADMIN — CEPHALEXIN 500 MG: 500 CAPSULE ORAL at 09:14

## 2021-11-14 RX ADMIN — CEPHALEXIN 500 MG: 500 CAPSULE ORAL at 20:02

## 2021-11-14 RX ADMIN — OLANZAPINE 10 MG: 10 TABLET, FILM COATED ORAL at 20:02

## 2021-11-14 RX ADMIN — ACETAMINOPHEN 650 MG: 325 TABLET, FILM COATED ORAL at 09:34

## 2021-11-14 RX ADMIN — DIVALPROEX SODIUM 1000 MG: 500 TABLET, EXTENDED RELEASE ORAL at 09:13

## 2021-11-14 RX ADMIN — OLANZAPINE 10 MG: 10 TABLET, FILM COATED ORAL at 09:13

## 2021-11-14 ASSESSMENT — ACTIVITIES OF DAILY LIVING (ADL)
DRESS: INDEPENDENT
LAUNDRY: WITH SUPERVISION
DRESS: SCRUBS (BEHAVIORAL HEALTH)
HYGIENE/GROOMING: INDEPENDENT
ORAL_HYGIENE: INDEPENDENT
HYGIENE/GROOMING: INDEPENDENT
ORAL_HYGIENE: INDEPENDENT
ORAL_HYGIENE: INDEPENDENT
LAUNDRY: WITH SUPERVISION
HYGIENE/GROOMING: INDEPENDENT
DRESS: INDEPENDENT

## 2021-11-14 NOTE — PLAN OF CARE
"  Patient was alert and oriented, cooperative. She was tense and irritable. She continues to be very paranoid. She was isolative to her room all shift and slept this evening. Speech is Normal. Patient's insight is Poor. Patient is Disheveled. The patient voices their needs appropriately. Patient denied all MH symptoms including SI,SIB,HI. She stated, \" I've been sleeping so I haven't been able to think.\" Patient has been eating,  and sleeping adequately. She needs to be encouraged to drink fluids. Writer gave her water and she had some fluids on her dinner tray. Patient is medication compliant, but needs reminders. Medication side effects were not observed or reported. From what writer could assess, patient's skin is intact, free from injuries or open sores. Plan is to continue to monitor patient status q 15 mins, assess response to medications, and maintain the patients safety.    Vital signs stable and denied pain  "

## 2021-11-14 NOTE — PLAN OF CARE
"Patient denied hallucinations this morning, both auditory and visual. She said, \"Not right now.\" Patient took morning medications without difficulty. Patient did not come out of her room much at all this shift and appears paranoid. Patient said she had a \"bad dream where some people were doing a bank robbery and they used me.\" Patient c/o \"ovary\" pain and thought it also could be pain r/t her UTI. Tylenol prn was given, patient rated it a 10/10. Patient was then sleeping shortly after med was giving. Patient has slept a lot throughout the shift today. Patient denied suicidal thoughts and thoughts of wishing to be dead.  Will continue to monitor and offer support.  "
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
## 2021-11-14 NOTE — PLAN OF CARE
Problem: Sleep Disturbance (Psychotic Signs/Symptoms)  Goal: Improved Sleep (Psychotic Signs/Symptoms)  Outcome: No Change  Note: Pt continues to sleep through majority of the night with no concerns noted.     NOC Shift Report    Pt in bed at beginning of shift, breathing quiet and unlabored. Pt slept through majority of the shift. Pt slept 5.5 hours.     Pt came out of room x1 during the night to request a snack. Pt presented with a flat affect and calm mood. No pt complaints or concerns at this time.     No PRNs given. Will continue to monitor.     Precautions: Suicide

## 2021-11-14 NOTE — PLAN OF CARE
" Assessment    Patient is alert and oriented, calm and cooperative.Her affect appears much brighter today, she was smiling and talkative. Speech is Normal. Patient's insight is Fair. Patient is casually groomed, her clothes were washed this shift and she took a shower earlier today. Patient has been isolative to her room. Patient endorses auditory hallucinations, but denied depression, anxiety, SI,SIB, racing thoughts,and HI. She is feeling \" hopeful and safe in the hospital.\" Patient has  been eating, hydrating, and sleeping adequately. Patient is medication compliant, doesn't need reminders. Medication side effects were not observed or reported this shift. She \" feeling like the medications are working because she isn't suicidal.\" From what writer could assess, patient's skin is intact, free from injuries or open sores. Plan is to continue to monitor patient status q 15 mins, assess response to medications, and maintain the patients safety.    Vital signs are stable  Denied pain    "

## 2021-11-15 PROCEDURE — 99232 SBSQ HOSP IP/OBS MODERATE 35: CPT | Performed by: PSYCHIATRY & NEUROLOGY

## 2021-11-15 PROCEDURE — 124N000002 HC R&B MH UMMC

## 2021-11-15 PROCEDURE — 250N000013 HC RX MED GY IP 250 OP 250 PS 637: Performed by: PSYCHIATRY & NEUROLOGY

## 2021-11-15 RX ADMIN — OLANZAPINE 10 MG: 10 TABLET, FILM COATED ORAL at 20:24

## 2021-11-15 RX ADMIN — DIVALPROEX SODIUM 1000 MG: 500 TABLET, EXTENDED RELEASE ORAL at 09:25

## 2021-11-15 RX ADMIN — CEPHALEXIN 500 MG: 500 CAPSULE ORAL at 09:25

## 2021-11-15 RX ADMIN — OLANZAPINE 10 MG: 10 TABLET, FILM COATED ORAL at 09:26

## 2021-11-15 RX ADMIN — CEPHALEXIN 500 MG: 500 CAPSULE ORAL at 20:24

## 2021-11-15 NOTE — PLAN OF CARE
Assessment/Intervention/Current Symtoms and Care Coordination  Patient has been sleeping on the floor all day.  She did get up and come out to the desk.  Let her know she has no insurance on file (as yet anyway).  Told her this has been called into the Business Office staff who will check on it.  Patient seems a little confused about social security reporting she had it.  This writer explained that is income tpaid to her for living expenses and not health insurance.    Discharge Plan or Goal  Uncertain as to the plan at this time especially since patient has no active health insurance on file (yet).    Barriers to Discharge   Patient needs further psychiatric assessment and medication management.    Referral Status  Not as yet.    Legal Status   Voluntary

## 2021-11-15 NOTE — PLAN OF CARE
Shift Summary  Legal status: voluntary  Mental  Visible in milieu only to  meal tray. Pt took tray to her room and ate. Isolative and withdrawn to self. Mood is calm and affect is flat. Pleasant and cooperative. Still hearing voices. Does not seem overtly depressed, anxious, manic or irritable. Denies being suicidal. Feels safe in hospital. No self injury behavior noted.   Prn: none  Physical  Pt alert and oriented x 3. Denies pain. No psychomotor abnormalities are noted. Slept 5.5 hours last night. Good medication compliance is noted.  No side effect reported by pt or noted by this writer. Appetite fair. Ate both breakfast and lunch. No problem with bowel and bladder per pt.   Prn: none  Sitting /90 pulse 94  Standing BP pulse- declined  Continue to monitor pt's status Q 15 minutes and stabilize the patient's symptoms with the use of medications and therapeutic programming.

## 2021-11-15 NOTE — PLAN OF CARE
BEHAVIORAL TEAM DISCUSSION    Participants: Dr. Mingo Javed; Rere ARMSTRONG; Hannah Benitez RN  Progress: Patient taking prescribed medications.  Anticipated length of stay: unknown  Continued Stay Criteria/Rationale: Needs medication management and considering chem dep evaluation.  Medical/Physical: Nothing noted  Precautions:   Behavioral Orders   Procedures    Code 1 - Restrict to Unit    Routine Programming     As clinically indicated    Status 15     Every 15 minutes.    Suicide precautions     Patients on Suicide Precautions should have a Combination Diet ordered that includes a Diet selection(s) AND a Behavioral Tray selection for Safe Tray - with utensils, or Safe Tray - NO utensils       Plan: Patient needs psychiatric assessment and medication management. Patient abusing substances Amphetamines, THC and cocaine so may want a CD evaluation and treatment.  Rationale for change in precautions or plan: No changes

## 2021-11-15 NOTE — PLAN OF CARE
NOC Shift Report    Pt in bed at beginning of shift, breathing quiet and unlabored. Pt slept 5.5 hours.     Pt was up at 2330 for a snack. Otherwise, slept through shift.    No PRNs requested or given.     Will continue to monitor via Q15 minute safety checks.     Precautions: suicide

## 2021-11-15 NOTE — PLAN OF CARE
Shift Summary  Legal status: voluntary  Mental  Visible in milieu for meals and making phone calls, otherwise stayed in room all this shift. Isolative and withdrawn to self. Mood is calm and affect is flat.   Still hearing voices. Denies suicidal/homicidal ideations, self injury behavior, racing thought as well as visual hallucinations. Insight and judgement is fair. Reporting both depression and anxiety. Did not participate in evening therapeutic group activity. Pt got upset and thought she knows one of our new pt ,but later said he is not the same person she knows.   Prn: none  Physical  Pt alert and oriented x 3. Denies pain. No psychomotor abnormalities are noted. Good medication compliance is noted. Seems tolerating medications well. No side effect reported by pt or noted by this writer. Appetite ok. Ate dinner .No problem with bowel and bladder per pt.   Prn: none  Sitting /81 pulse 116  Continue to monitor pt's status Q 15 minutes and stabilize the patient's symptoms with the use of medications and therapeutic programming.

## 2021-11-15 NOTE — PROGRESS NOTES
"[facility], [place]   Psychiatric Progress Note        Interim History:   The patient's care was discussed with the treatment team during the daily team meeting and/or staff's chart notes were reviewed.  Staff report patient was admitted over weekend and stayed in her room throughout yesterday and today am getting outside only to  her meal tray. Was reported to sleep about 5.5 hours and also napped on her mattress on the floor during the day.     Met with patient: she was laying on the floor. Had tray with food next to her on her mattress, appeared to be disheveled. Greeted me, admitted to hearing Auditory hallucinations and being off her psychotropic meds for one month. When asked if she felt safe at this hospital, said that she wasn't but denied Suicidal ideation, Homicidal thoughts. When asked about cocaine, meth and THC found in her urine, said: \"I was laced\". Firmly stated that she was not interested in CD treatment: \"I was laced\" and turned away.          Medications:       cephALEXin  500 mg Oral BID     divalproex sodium extended-release  1,000 mg Oral Daily     OLANZapine  10 mg Oral BID          Allergies:     Allergies   Allergen Reactions     Iodine Rash          Labs:   No results found for this or any previous visit (from the past 24 hour(s)).       Psychiatric Examination:     /81 (BP Location: Left arm, Patient Position: Sitting)   Pulse 116   Temp 97.7  F (36.5  C) (Oral)   Resp 16   Ht 1.575 m (5' 2\")   Wt 72.6 kg (160 lb)   SpO2 99%   BMI 29.26 kg/m    Weight is 160 lbs 0 oz  Body mass index is 29.26 kg/m .    Orthostatic Vitals  Report      Most Recent      Sitting Orthostatic /90 11/15 0700    Sitting Orthostatic Pulse (bpm) 94 11/15 0700          Appearance: awake, alert, dressed in hospital scrubs, appeared as age stated and unkempt  Attitude:  somewhat cooperative  Eye Contact:  poor   Mood:  irritable  Affect:  constricted " mobility  Speech:  clear, coherent  Psychomotor Behavior:  no evidence of tardive dyskinesia, dystonia, or tics  Throught Process:  goal oriented  Associations:  no loose associations  Thought Content:  auditory hallucinations present  Insight:  limited  Judgement:  poor  Oriented to:  time, person, and place  Attention Span and Concentration: poor  Recent and Remote Memory:  fair    Clinical Global Impressions  First: 7/4  11/15/2021      Most recent:            Precautions:     Behavioral Orders   Procedures     Code 1 - Restrict to Unit     Routine Programming     As clinically indicated     Status 15     Every 15 minutes.     Suicide precautions     Patients on Suicide Precautions should have a Combination Diet ordered that includes a Diet selection(s) AND a Behavioral Tray selection for Safe Tray - with utensils, or Safe Tray - NO utensils            DIagnoses:     1.  Schizoaffective disorder, depressed.  2.  Rule out schizophrenia, paranoid type.  3.  Rule out substance-induced psychosis.  4.  Polysubstance abuse.         Plan:   Patient was restarted on her PTA meds. No medication changes today. Per CTC she doesn't have a health insurance, will talk to business office about that. Will continue to provide support and structure.

## 2021-11-16 ENCOUNTER — TELEPHONE (OUTPATIENT)
Dept: BEHAVIORAL HEALTH | Facility: CLINIC | Age: 36
End: 2021-11-16

## 2021-11-16 PROCEDURE — 250N000013 HC RX MED GY IP 250 OP 250 PS 637: Performed by: PSYCHIATRY & NEUROLOGY

## 2021-11-16 PROCEDURE — 124N000002 HC R&B MH UMMC

## 2021-11-16 PROCEDURE — 99232 SBSQ HOSP IP/OBS MODERATE 35: CPT | Performed by: PSYCHIATRY & NEUROLOGY

## 2021-11-16 RX ADMIN — DIVALPROEX SODIUM 1000 MG: 500 TABLET, EXTENDED RELEASE ORAL at 08:33

## 2021-11-16 RX ADMIN — TRAZODONE HYDROCHLORIDE 50 MG: 50 TABLET ORAL at 19:35

## 2021-11-16 RX ADMIN — OLANZAPINE 10 MG: 10 TABLET, FILM COATED ORAL at 08:33

## 2021-11-16 RX ADMIN — OLANZAPINE 10 MG: 10 TABLET, FILM COATED ORAL at 19:35

## 2021-11-16 RX ADMIN — CEPHALEXIN 500 MG: 500 CAPSULE ORAL at 08:33

## 2021-11-16 ASSESSMENT — ACTIVITIES OF DAILY LIVING (ADL)
DRESS: INDEPENDENT
HYGIENE/GROOMING: INDEPENDENT
ORAL_HYGIENE: INDEPENDENT
LAUNDRY: WITH SUPERVISION

## 2021-11-16 NOTE — PLAN OF CARE
Shift Summary  Legal status: voluntary  Mental  Stayed in room all this shift. Isolative and withdrawn to self. Mood is calm and affect is flat. Had night mare last night which people were eating dogs meat. Pt said she was horrified from this dream and was not able to sleep well. Cooperative and pleasant. Denies suicidal/homicidal ideations, self injury behavior, racing thought as well as auditory and visual hallucinations. Did not participate in any therapeutic group activity. Does not seem overtly depressed, anxious, manic or irritable. Thought content is not significant for apparent paranoia, delusions thought, psychosis or disorganized behavior. Pt was noticed using unit phone few times.   Prn: none  Physical  Pt alert and oriented x 3. Denies pain. No psychomotor abnormalities are noted. Slept 6.5 hours last night. Good medication compliance is noted. Seems tolerating medications well. No side effect reported by pt or noted by this writer. Appetite adequate. Ate both breakfast and lunch. No problem with bowel and bladder per pt.   Prn: none  Sitting BP pulse- declined   Standing BP pulse- declined  Continue to monitor pt's status Q 15 minutes and stabilize the patient's symptoms with the use of medications and therapeutic programming.
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
## 2021-11-16 NOTE — PLAN OF CARE
Assessment/Intervention/Current Symtoms and Care Coordination  Patient is uninsured and homeless.  The Business Office ([employer]) called up here yesterday and the patient refused to take the call per Lyric's report to me today.  Lyric plans to call the patient again.  This writer went and spoke to the patient and told her that she needs to take the call because she is uninsured.  She was laying on the floor of her room with food spread out on the floor eating an apple. Patient said she would take the call from Lyric.  Lyric called and patient took the call and proceeded to give her a different first and last name, different social security number and different birthdate.  The Business Office cannot look up anything without truthful, accurate information which the patient refuses to give.  This writer talked to patient again.  She is from [place] and has been living at the [facility].  She claims she gave Lyric accurate information which is not the case.  She appears to be malingering.      Discharge Plan or Goal  Patient is currently uninsured and cannot be set up with community appts    Barriers to Discharge   Needs further medication management and stabilization.    Referral Status  None    Legal Status  Voluntary

## 2021-11-16 NOTE — PROGRESS NOTES
"[facility], [place]   Psychiatric Progress Note        Interim History:   The patient's care was discussed with the treatment team during the daily team meeting and/or staff's chart notes were reviewed.  Staff report patient was admitted over weekend and stayed in her room throughout yesterday and today am getting outside only to  her meal tray. This has not changed since yesterday. Was reported to sleep about 6.5 hours and also napped on her mattress on the floor during the day. Per CTC patient is uninsured and homeless. She first refused to talk to business office, with encouragement took a call and was reported during that phone talk to give business  different name, social security number.    Met with patient: she was laying on the floor/mattress. Had food spread out around her. Today she was more polite. Admitted that she was homeless, but said that she had an insurance: \"these people who say I don't have it know nothing about how it works\". In the end of our meeting suddenly said that she was afraid of FBI who was after her because: \"I have a lot of knowledge\". She smiled while saying that and I was unsure if she was joking or really believing that FBI was after her. Cooperated with meds, asked me to increase her Depakote dose. I told her that prior to increasing it we would have to check her Valproic Acid level and it was too early to do that. Moncho indicated that she heard me and had no other questions or concerns.          Medications:       divalproex sodium extended-release  1,000 mg Oral Daily     OLANZapine  10 mg Oral BID          Allergies:     Allergies   Allergen Reactions     Iodine Rash          Labs:   No results found for this or any previous visit (from the past 24 hour(s)).       Psychiatric Examination:     /81 (BP Location: Left arm, Patient Position: Sitting)   Pulse 116   Temp 97.7  F (36.5  C) (Oral)   Resp 16   Ht 1.575 m (5' 2\")  "  Wt 72.6 kg (160 lb)   SpO2 99%   BMI 29.26 kg/m    Weight is 160 lbs 0 oz  Body mass index is 29.26 kg/m .    Orthostatic Vitals  Report      Most Recent      Sitting Orthostatic /90 11/15 0700    Sitting Orthostatic Pulse (bpm) 94 11/15 0700         Appearance: awake, alert, dressed in hospital scrubs, appeared as age stated and unkempt  Attitude:  somewhat cooperative  Eye Contact: better  Mood: less  irritable  Affect:  constricted mobility  Speech:  clear, coherent  Psychomotor Behavior:  no evidence of tardive dyskinesia, dystonia, or tics  Throught Process:  goal oriented  Associations:  no loose associations  Thought Content:  auditory hallucinations present off and on, delusions are likely?  Insight:  limited  Judgement:  poor  Oriented to:  time, person, and place  Attention Span and Concentration: better  Recent and Remote Memory:  fair    Clinical Global Impressions  First: 7/4  11/15/2021      Most recent:            Precautions:     Behavioral Orders   Procedures     Code 1 - Restrict to Unit     Routine Programming     As clinically indicated     Status 15     Every 15 minutes.     Suicide precautions     Patients on Suicide Precautions should have a Combination Diet ordered that includes a Diet selection(s) AND a Behavioral Tray selection for Safe Tray - with utensils, or Safe Tray - NO utensils            DIagnoses:     1.  Schizoaffective disorder, depressed.  2.  Rule out schizophrenia, paranoid type.  3.  Rule out substance-induced psychosis.  4.  Polysubstance abuse.         Plan:   Patient was restarted on her PTA meds. No medication changes today. Per CTC she doesn't have a health insurance, will continue to talk to business office about that. See discussion above. Will continue to provide support and structure.

## 2021-11-17 PROCEDURE — 99232 SBSQ HOSP IP/OBS MODERATE 35: CPT | Performed by: NURSE PRACTITIONER

## 2021-11-17 PROCEDURE — 124N000002 HC R&B MH UMMC

## 2021-11-17 PROCEDURE — 250N000013 HC RX MED GY IP 250 OP 250 PS 637: Performed by: PSYCHIATRY & NEUROLOGY

## 2021-11-17 RX ADMIN — OLANZAPINE 10 MG: 10 TABLET, FILM COATED ORAL at 19:29

## 2021-11-17 RX ADMIN — OLANZAPINE 10 MG: 10 TABLET, FILM COATED ORAL at 09:06

## 2021-11-17 RX ADMIN — TRAZODONE HYDROCHLORIDE 50 MG: 50 TABLET ORAL at 22:37

## 2021-11-17 RX ADMIN — HYDROXYZINE HYDROCHLORIDE 25 MG: 25 TABLET, FILM COATED ORAL at 19:29

## 2021-11-17 RX ADMIN — DIVALPROEX SODIUM 1000 MG: 500 TABLET, EXTENDED RELEASE ORAL at 09:06

## 2021-11-17 ASSESSMENT — ACTIVITIES OF DAILY LIVING (ADL)
ORAL_HYGIENE: INDEPENDENT
DRESS: INDEPENDENT
LAUNDRY: WITH SUPERVISION
HYGIENE/GROOMING: INDEPENDENT

## 2021-11-17 NOTE — PLAN OF CARE
Assessment/Intervention/Current Symtoms and Care Coordination  -Chart review  -Pre round meeting with team  -Rounded with team, addressed patient needs/concerns  -Post round meeting with team  Current Symptoms include the following: patient is irritable and paranoia    Reviewed CTC notes from [address] for psychosocial situation.  Pt has been at [facility].  Pt does not have health insurance and is not cooperating with the Financial Counseling Office by giving them different demographic information.    Pt has not been visible to me on the unit today.    Discharge Plan or Goal  Pending stabilization & development of a safe discharge plan.  Considerations include: Co Occurring substance use program    Barriers to Discharge  Pt is homeless and may not want to return to the current living situation  Pt does not have insurance and is not cooperating with Financial Counseling.    Referral Status  Substance Use Programming - referals paused     Legal Status  Patient is voluntary

## 2021-11-17 NOTE — PLAN OF CARE
Problem: Cognitive Impairment (Psychotic Signs/Symptoms)  Goal: Optimal Cognitive Function (Psychotic Signs/Symptoms)  Outcome: No Change       Patient was visible in the milieu.  Pt denies auditory and visual hallucination although appears responding. Pt denies suicidal ideation.   Pt ate breakfast and lunch. Pt was medication compliant, no medication side effect reported or noted.  No groups attended.  Pt communicates and verbalizes needs to staff. Will continue to monitor behavior and encourage engagement.     NURSING ASSESSMENT  Pain: denies  Anxiety:  denies   Depression: denies  SI: denies  HI: denies  AVH: denies, appears responding to internal stimuli.   Mood/Affect: blunted flat  Medication: compliant, no side effects reported   Appetite:    Ate breakfast and lunch  ADLs: independent   Visits: none  Vitals: WNL   Medical: denies

## 2021-11-17 NOTE — TELEPHONE ENCOUNTER
R: 7:45PM-  accepts for transfer to 30 d/t paranoia and threatening behaviors.  Keep under  and she will work it out with Dr. SALAS tomorrow.

## 2021-11-17 NOTE — PLAN OF CARE
Night Shift Summary (11/16/21 into 11/17/21)    Pt in bed sleeping at start of shift. Breathing quiet and unlabored. Out at 0400 requesting cereal. Informed writer that she came from a different unit. Polite and appropriate. Appears to have slept 6.75 hours.    Suicide precautions in addition to No Roommate order; any related events noted above.     Will continue to monitor and assess.       Problem: Behavioral Health Plan of Care  Goal: Absence of New-Onset Illness or Injury  Outcome: Improving     Problem: Sleep Disturbance (Psychotic Signs/Symptoms)  Goal: Improved Sleep (Psychotic Signs/Symptoms)  Outcome: Improving

## 2021-11-17 NOTE — PLAN OF CARE
BEHAVIORAL TEAM DISCUSSION    Participants:   Lauren GAYLE, Jacqui ARMSTRONG,  Tania Baltazar RN, Kelly Hayward OT      Progress:  Patient has auditory and visual hallucinations that tell her to kill herself by jumping off the building. Patient is paranoid that the cartel is coming after her.   Pt transferred from [facility] due to paranoid issues with a peer.    Pt does not have health insurance and is not cooperating with the Financial Office to get this. Pt has been at the [facility] though she has stayed with mother who has an apartment.    Drug screen shows amphetamine, Cannabis and cocaine.    Anticipated length of stay:   2 weeks    Continued Stay Criteria/Rationale:   The pt is not able to function in the community.    Medical/Physical:   No active issues were discussed.  Covid and pregnancy are negative    Precautions:   Behavioral Orders   Procedures     Code 1 - Restrict to Unit     Routine Programming     As clinically indicated     Status 15     Every 15 minutes.     Suicide precautions     Patients on Suicide Precautions should have a Combination Diet ordered that includes a Diet selection(s) AND a Behavioral Tray selection for Safe Tray - with utensils, or Safe Tray - NO utensils       Plan:   Medication Management for mental health symptoms  Continued evaluation of pt  Work with the pt on obtaining insurance  Develop outpatient plan  Consider subtance use evaluation    Rationale for change in precautions or plan: first review with new team

## 2021-11-17 NOTE — PROGRESS NOTES
Patient is an accepted transfer from [facility].  Patient arrived to the unit escorted by her nurse.  Patient belongings are placed in her locker. Patient is oriented to the unit, shown her room, and given hygiene products.  Patient denies any questions or concerns.  Patient has a full range affect.  Denies thoughts of SI, SIB, and HI.  Patient ask if she may use the telephone and go to bed.  Continue with plan of care.

## 2021-11-17 NOTE — PROGRESS NOTES
[facility], [place]   Psychiatric Progress Note        Interim History:   The patient's care was discussed with the treatment team during the daily team meeting and staff's chart notes were reviewed. Staff report patient alert and oriented x4,  adequately groomed. Pt is pleasant, good eye contact, cooperative, medication compliant. Pt denies auditory and visual hallucination although appears responding. Pt denies suicidal ideation.  No behaviors observed this shift. Pt contracts for safety. Pt ate break fast engaged in partial group activities, observed unit milieu, appropriate with peers and staff.  Communicates and verbalizes needs to staff.    Met with patient, she was in bed sleeping when I approached her. Patient states she was transfered over to station 30 because another patient who is a desmond was looking and making passes at her. She states she wasn't safe to remain in that unit. States she is happy she was transferred.  Appears  bright and pleasant but  hyper verbal. She states she was missing some doses of her antibiotics.  Patient reports she was supposed to take her keflex for 7days but had only gotten it for 4 days. Reassured patient that I will confirm with pharmacy before prescribing any additional doses of keflex. Spoke with pharmacy and they said patient had her first 3 doses when she was at [facility] and the remaining 4 doses were completed while admitted in patient with her last dose given yesterday. She denies SI/HI, depression and anxiety. Denies hallucinations and delusions. Denies any concerns with medications. eating and sleeping well.          Medications:       divalproex sodium extended-release  1,000 mg Oral Daily     OLANZapine  10 mg Oral BID          Allergies:     Allergies   Allergen Reactions     Iodine Rash          Labs:   No results found for this or any previous visit (from the past 24 hour(s)).       Psychiatric Examination:     /74  "(BP Location: Right arm)   Pulse 106   Temp 98.7  F (37.1  C) (Oral)   Resp 16   Ht 1.575 m (5' 2\")   Wt 72.6 kg (160 lb)   SpO2 99%   BMI 29.26 kg/m    Weight is 160 lbs 0 oz  Body mass index is 29.26 kg/m .  Orthostatic Vitals  Report    None        Appearance: awake, alert, dressed in hospital scrubs, appeared as age stated and unkempt  Attitude:  somewhat cooperative  Eye Contact: better  Mood: less  irritable  Affect:  constricted mobility  Speech:  clear, coherent  Psychomotor Behavior:  no evidence of tardive dyskinesia, dystonia, or tics  Throught Process:  goal oriented  Associations:  no loose associations  Thought Content:  auditory hallucinations present off and on, delusions are likely?  Insight:  limited  Judgement:  poor  Oriented to:  time, person, and place  Attention Span and Concentration: better  Recent and Remote Memory:  fair     Clinical Global Impressions  First:     Considering your total clinical experience with this particular patient population, how severe are the patient's symptoms at this time?: 7 (11/17/21 1558)  Compared to the patient's condition at the START of treatment, this patient's condition is: 3 (11/17/21 1558)    Most recent:     Considering your total clinical experience with this particular patient population, how severe are the patient's symptoms at this time?: 7 (11/17/21 1558)  Compared to the patient's condition at the START of treatment, this patient's condition is: 3 (11/17/21 1558         Precautions:     Behavioral Orders   Procedures     Code 1 - Restrict to Unit     Routine Programming     As clinically indicated     Status 15     Every 15 minutes.     Suicide precautions     Patients on Suicide Precautions should have a Combination Diet ordered that includes a Diet selection(s) AND a Behavioral Tray selection for Safe Tray - with utensils, or Safe Tray - NO utensils              DIagnoses:     1.  Schizoaffective disorder, depressed.  2.  Rule out " "schizophrenia, paranoid type.  3.  Rule out substance-induced psychosis.  4.  Polysubstance abuse.         Assessment and Plan:   Renea is a 35-year-old -American female with a long history of mental illness, previously diagnosed as bipolar disorder and schizophrenia, who was initially evaluated at [facility] because of increasing symptoms of depression, hallucinations and paranoid delusions.  Reportedly she has been experiencing command hallucinations telling her to kill herself, particularly by jumping off a building.     At [facility] the patient was referred to [facility] and was evaluated by Dr. Dwight Odell.  She told Dr. Odell that she has been \"seeing dark shadows in the corners\" and reported that other patients outside of her room have been sent to watch her and stalk her.  Her urine drug screen was positive for cocaine, amphetamines and cannabis.  Reportedly the patient has not been taking her medications for approximately 1 month prior to admission.  At [facility] her Zyprexa and Depakote were resumed and the patient was transferred to this hospital and placed on station 10.     The patient refused to come to the examination room and when I went to her room, refused to engage in any reasonable discussion.  She had never made any eye contact and was lying on her side on the floor, on a mattress, with her back to the examiner.  She only acknowledged being depressed and continuing to hear voices, but would not provide any other information about herself and her history.  Earlier today, however, she was engaging better and talked to the  to whom she admitted to have been having mental illness and suicidal ideations since childhood.  She was hospitalized on numerous occasions in the [facility] and was just recently relocated to [place].  She had at least one suicide attempt in the past.  Psychiatric treatment/inteventions:  Medications:   Zyprexa 10 mg BID   " Depakote ER 1000 mg    Patient will be treated in therapeutic milieu with appropriate individual and group therapies as described.     Medical treatment/interventions:  Medical concerns:   Bladder infection.     Patient just completed  her 7 days course of keflex.     Disposition Plan   Reason for ongoing admission: no safe alternative to hospitalization.  Discharge location: TBD   Discharge Medications: not ordered  Follow-up Appointments: not scheduled  [facility], Date: 11/17/2021  Time: 3955

## 2021-11-18 VITALS
SYSTOLIC BLOOD PRESSURE: 123 MMHG | WEIGHT: 187 LBS | BODY MASS INDEX: 34.41 KG/M2 | OXYGEN SATURATION: 98 % | TEMPERATURE: 98.5 F | RESPIRATION RATE: 16 BRPM | HEART RATE: 104 BPM | HEIGHT: 62 IN | DIASTOLIC BLOOD PRESSURE: 86 MMHG

## 2021-11-18 LAB — VALPROATE SERPL-MCNC: 64 MG/L

## 2021-11-18 PROCEDURE — 36415 COLL VENOUS BLD VENIPUNCTURE: CPT | Performed by: PSYCHIATRY & NEUROLOGY

## 2021-11-18 PROCEDURE — 250N000013 HC RX MED GY IP 250 OP 250 PS 637: Performed by: PSYCHIATRY & NEUROLOGY

## 2021-11-18 PROCEDURE — 80164 ASSAY DIPROPYLACETIC ACD TOT: CPT | Performed by: PSYCHIATRY & NEUROLOGY

## 2021-11-18 PROCEDURE — 99239 HOSP IP/OBS DSCHRG MGMT >30: CPT | Performed by: PSYCHIATRY & NEUROLOGY

## 2021-11-18 PROCEDURE — H2032 ACTIVITY THERAPY, PER 15 MIN: HCPCS

## 2021-11-18 RX ORDER — OLANZAPINE 10 MG/1
10 TABLET ORAL 2 TIMES DAILY
Qty: 60 TABLET | Refills: 0 | Status: SHIPPED | OUTPATIENT
Start: 2021-11-18

## 2021-11-18 RX ORDER — DIVALPROEX SODIUM 500 MG/1
1000 TABLET, EXTENDED RELEASE ORAL DAILY
Qty: 60 TABLET | Refills: 0 | Status: SHIPPED | OUTPATIENT
Start: 2021-11-18

## 2021-11-18 RX ADMIN — DIVALPROEX SODIUM 1000 MG: 500 TABLET, EXTENDED RELEASE ORAL at 08:39

## 2021-11-18 RX ADMIN — OLANZAPINE 10 MG: 10 TABLET, FILM COATED ORAL at 08:39

## 2021-11-18 ASSESSMENT — ACTIVITIES OF DAILY LIVING (ADL)
ORAL_HYGIENE: INDEPENDENT
HYGIENE/GROOMING: INDEPENDENT
DRESS: INDEPENDENT
LAUNDRY: WITH SUPERVISION
ORAL_HYGIENE: INDEPENDENT
LAUNDRY: WITH SUPERVISION
HYGIENE/GROOMING: INDEPENDENT
DRESS: INDEPENDENT

## 2021-11-18 ASSESSMENT — MIFFLIN-ST. JEOR: SCORE: 1506.48

## 2021-11-18 NOTE — DISCHARGE INSTRUCTIONS
Behavioral Discharge Planning and Instructions    Summary: You were admitted to [facility] on 11/12/2021  due to psychosis and suicidal ideation..  You were treated by Dr. Javed.  You were transferred to [facility] on 11/16/21 under the care of Lauren GAYLE and then Dr. Padilla.   Medications were prescribed. You did not participate in the group programming. You asked for discharge on 11/18/21. You were discharged on 11/18/21 from [facility] 30  to Shelter at [facility]. The hospital is providing a cab for you to return to the [facility].        Main Diagnosis:   1.  Schizoaffective disorder, depressed.  2.  Rule out schizophrenia, paranoid type.  3.  Rule out substance-induced psychosis.  4.  Polysubstance abuse.      Health Care Follow-up:   You came in without health  insurance. You met with a Financial Counselor who was trying to help you get insurance but you gave them conflicting information.  You do not have any medical records in the system.      You are returning to:  [address] [address]  Main # for the shelter is [phone]    There is a [facility] on the 2nd floor of the [facility].  [phone]  You can call this number and speak t the , Isabelle Oviedo, about helping you get services you need.      Participate in your screening phone call with Brenda Beltrán for psychiatric medication management services on Monday, November 22, 2021 @3PM.  [facility]  Human Services Building  [address] [place] [id number]  General Information: [phone]  Appointments: [phone]  Nurse Line: [phone]  If you need to get in earlier, call the nurse line - [phone].  The Health Unit Coordinator has faxed these discharge instructions have been faxed to fax: [phone]      Attend all scheduled appointments with  your outpatient providers. Call at least 24 hours in advance if you need to reschedule an appointment to ensure continued access to your outpatient providers.     Major Treatments, Procedures and Findings:  You were provided with: a psychiatric assessment, assessed for medical stability, medication evaluation and/or management and milieu management    Symptoms to Report: mood getting worse or thoughts of suicide    Early warning signs can include: increased thoughts or behaviors of suicide or self-harm     Safety and Wellness:  Take all medicines as directed.  Make no changes unless your doctor suggests them.      Follow treatment recommendations.  Refrain from alcohol and non-prescribed drugs.  If there is a concern for safety, call 911.    Resources:   National [place] on Mental Illness (www.[place].rupa.org): 646.417.3572 or 088-223-3600.    Crisis services are available 24/7 if you are having a mental health crisis.    COPE (Community Outreach for Psychiatric Emergencies): 882.519.2028    In the [facility], call **CRISIS (730210) from a cell phone to talk to a team of professionals who can help you.    [facility] Wide Crisis Text Line: Text [place] to 797282  o Suicide Prevention Lifeline: 4-646-212-TALK (2495)  o Crisis Text Line Service (available 24 hours a day, 7 days a week): Text [place] to 696281  Call  **CRISIS (463741) from a cell phone to talk to a team of professionals who can help you.      These are   crisis residences where you can stay for a short time if you feel like you need to stabilize but do not need to go to the hospital.    [facility]  - Crisis Beds  1.  [facility] Crisis Stabilization Program  [address] [place] [address]  Phone:[phone]      2.  [facility] Crisis Residence  [address] [place] [address]  Phone: [phone]    3.  [facility] Crisis Residence  [address] [place] [address]  Phone: [phone]      If you ever need  immediate access to services, [facility] has a walk in triage services to help you with what you need. This Triage Center is located in Room N141 at [address] [facility]. Go through the front door of [address], and follow signs to [address].   Triage hours:9:00 am - 5:00 pm  Triage Phone Number: [phone]    General Medication Instructions:   See your medication sheet(s) for instructions.   Take all medicines as directed.  Make no changes unless your doctor suggests them.   Go to all your doctor visits.  Be sure to have all your required lab tests. This way, your medicines can be refilled on time.  Do not use any drugs not prescribed by your doctor.  Avoid alcohol.    Advance Directives:   Scanned document on file with [employer]? No scanned doc  Is document scanned? Pt states no documents  Honoring Choices Your Rights Handout: Informed and given  Was more information offered? Materials given    The Treatment team has appreciated the opportunity to work with you. If you have any questions or concerns about your recent admission, you can contact the unit which can receive your call 24 hours a day, 7 days a week. They will be able to get in touch with a Provider if needed. The unit number is [phone]   .

## 2021-11-18 NOTE — PROGRESS NOTES
Discharge Note:     Patient discharged today Thursday 11/18/2021 at 1915 accompanied by: self via cab and destination is [facility] located at [address] [place] [address].     Discharge paperwork reviewed and medications reviewed with patient who verbalizes understanding.      Patient has verbalized understanding of discharge instructions and medication administration.      Patient states that she is ready for discharge. Affect presents as full range on approach. Mood is calm. Denies depression, anxiety, suicidal ideation, self injurious thoughts, homicidal ideation, and auditory/visual hallucinations. Denies anxiety and depression.      AVS reviewed, received and signed for: Yes     Medications from pharmacy received:Yes     Security item returned: Yes     Locker items received: Yes

## 2021-11-18 NOTE — DISCHARGE SUMMARY
"Psychiatric Discharge Summary    Kylie Meier MRN# [id number]   Age: 35 year old YOB: 1985     Date of Admission:  11/12/2021  Date of Discharge:  11/18/2021  7:24 PM  Admitting Physician:  Mingo Javed MD  Discharge Physician:  Mona Padilla DO         Event Leading to Hospitalization:   Ms. Meier is a 35-year-old -American female with a long history of mental illness, previously diagnosed as bipolar disorder and schizophrenia, who was initially evaluated at [facility] because of increasing symptoms of depression, hallucinations and paranoid delusions.  Reportedly she has been experiencing command hallucinations telling her to kill herself, particularly by jumping off a building.     At [facility] the patient was referred to [facility] and was evaluated by Dr. Dwight Odell.  She told Dr. Odell that she has been \"seeing dark shadows in the corners\" and reported that other patients outside of her room have been sent to watch her and stalk her.  Her urine drug screen was positive for cocaine, amphetamines and cannabis.  Reportedly the patient has not been taking her medications for approximately 1 month prior to admission.  At [facility] her Zyprexa and Depakote were resumed and the patient was transferred to this hospital and placed on station 10.     The patient refused to come to the examination room and when I went to her room, refused to engage in any reasonable discussion.  She had never made any eye contact and was lying on her side on the floor, on a mattress, with her back to the examiner.  She only acknowledged being depressed and continuing to hear voices, but would not provide any other information about herself and her history.  Earlier today, however, she was engaging better and talked to the  to whom she admitted to have been having mental illness and suicidal ideations since childhood.  She was hospitalized on numerous occasions " in the State of [place] and was just recently relocated to [place].  She had at least one suicide attempt in the past.          See Admission note by David Thomas MD on 11/13/21 for additional details.          Diagnoses:     1.  Schizoaffective disorder, depressed.  2.  Rule out schizophrenia, paranoid type.  3.  Rule out substance-induced psychosis.  4.  Polysubstance abuse.         Labs:     Recent Results (from the past 336 hour(s))   Asymptomatic COVID-19 Virus (Coronavirus) by PCR Nasopharyngeal    Collection Time: 11/10/21  6:37 PM    Specimen: Nasopharyngeal; Swab   Result Value Ref Range    SARS CoV2 PCR Negative Negative   UA with Microscopic reflex to Culture    Collection Time: 11/10/21  6:47 PM    Specimen: Urine, Midstream   Result Value Ref Range    Color Urine Light Yellow Colorless, Straw, Light Yellow, Yellow    Appearance Urine Clear Clear    Glucose Urine Negative Negative mg/dL    Bilirubin Urine Negative Negative    Ketones Urine Negative Negative mg/dL    Specific Gravity Urine 1.029 1.003 - 1.035    Blood Urine Negative Negative    pH Urine 6.0 5.0 - 7.0    Protein Albumin Urine Negative Negative mg/dL    Urobilinogen Urine Normal Normal, 2.0 mg/dL    Nitrite Urine Negative Negative    Leukocyte Esterase Urine Small (A) Negative    Mucus Urine Present (A) None Seen /LPF    RBC Urine 1 <=2 /HPF    WBC Urine 36 (H) <=5 /HPF    Squamous Epithelials Urine 2 (H) <=1 /HPF   HCG qualitative urine    Collection Time: 11/10/21  6:47 PM   Result Value Ref Range    hCG Urine Qualitative Negative Negative   Drug abuse screen 77 urine (FL, RH, SH)    Collection Time: 11/10/21  6:47 PM   Result Value Ref Range    Amphetamines Urine Screen Positive (A) Screen Negative    Barbiturates Urine Screen Negative Screen Negative    Benzodiazepines Urine Screen Negative Screen Negative    Cannabinoids Urine Screen Positive (A) Screen Negative    Cocaine Urine Screen Positive (A) Screen Negative    Opiates Urine  Screen Negative Screen Negative    PCP Urine Screen Negative Screen Negative   Urine Culture    Collection Time: 11/10/21  6:47 PM    Specimen: Urine, Midstream   Result Value Ref Range    Culture 10,000-50,000 CFU/mL Escherichia coli (A)        Susceptibility    Escherichia coli - MELVI     Ampicillin >=32.0 Resistant ug/mL     Ampicillin/ Sulbactam 4.0 Susceptible ug/mL     Piperacillin/Tazobactam <=4.0 Susceptible ug/mL     Cefazolin* <=4.0 Susceptible ug/mL      * Cefazolin MELVI breakpoints are for the treatment of uncomplicated urinary tract infections. For the treatment of systemic infections, please contact the laboratory for additional testing.     Cefoxitin <=4.0 Susceptible ug/mL     Ceftazidime <=1.0 Susceptible ug/mL     Ceftriaxone <=1.0 Susceptible ug/mL     Cefepime <=1.0 Susceptible ug/mL     Gentamicin <=1.0 Susceptible ug/mL     Tobramycin <=1.0 Susceptible ug/mL     Ciprofloxacin <=0.25 Susceptible ug/mL     Levofloxacin <=0.12 Susceptible ug/mL     Nitrofurantoin <=16.0 Susceptible ug/mL     Trimethoprim/Sulfamethoxazole <=1/19 Susceptible ug/mL   Comprehensive metabolic panel    Collection Time: 11/14/21  7:48 AM   Result Value Ref Range    Sodium 136 133 - 144 mmol/L    Potassium 4.4 3.4 - 5.3 mmol/L    Chloride 104 94 - 109 mmol/L    Carbon Dioxide (CO2) 28 20 - 32 mmol/L    Anion Gap 4 3 - 14 mmol/L    Urea Nitrogen 20 7 - 30 mg/dL    Creatinine 0.89 0.52 - 1.04 mg/dL    Calcium 8.6 8.5 - 10.1 mg/dL    Glucose 79 70 - 99 mg/dL    Alkaline Phosphatase 59 40 - 150 U/L    AST 12 0 - 45 U/L    ALT 20 0 - 50 U/L    Protein Total 6.9 6.8 - 8.8 g/dL    Albumin 2.9 (L) 3.4 - 5.0 g/dL    Bilirubin Total 0.3 0.2 - 1.3 mg/dL    GFR Estimate 85 >60 mL/min/1.73m2   Lipid panel    Collection Time: 11/14/21  7:48 AM   Result Value Ref Range    Cholesterol 144 <200 mg/dL    Triglycerides 73 <150 mg/dL    Direct Measure HDL 54 >=50 mg/dL    LDL Cholesterol Calculated 75 <=100 mg/dL    Non HDL Cholesterol 90 <130  "mg/dL   TSH with free T4 reflex and/or T3 as indicated    Collection Time: 11/14/21  7:48 AM   Result Value Ref Range    TSH 1.02 0.40 - 4.00 mU/L   CBC with platelets and differential    Collection Time: 11/14/21  7:48 AM   Result Value Ref Range    WBC Count 5.0 4.0 - 11.0 10e3/uL    RBC Count 4.09 3.80 - 5.20 10e6/uL    Hemoglobin 12.8 11.7 - 15.7 g/dL    Hematocrit 39.2 35.0 - 47.0 %    MCV 96 78 - 100 fL    MCH 31.3 26.5 - 33.0 pg    MCHC 32.7 31.5 - 36.5 g/dL    RDW 13.6 10.0 - 15.0 %    Platelet Count 305 150 - 450 10e3/uL   Manual Differential    Collection Time: 11/14/21  7:48 AM   Result Value Ref Range    % Neutrophils 46 %    % Lymphocytes 44 %    % Monocytes 7 %    % Eosinophils 3 %    % Basophils 0 %    Absolute Neutrophils 2.3 1.6 - 8.3 10e3/uL    Absolute Lymphocytes 2.2 0.8 - 5.3 10e3/uL    Absolute Monocytes 0.4 0.0 - 1.3 10e3/uL    Absolute Eosinophils 0.2 0.0 - 0.7 10e3/uL    Absolute Basophils 0.0 0.0 - 0.2 10e3/uL    RBC Morphology Confirmed RBC Indices     Platelet Assessment  Automated Count Confirmed. Platelet morphology is normal.     Automated Count Confirmed. Platelet morphology is normal.   Valproic acid    Collection Time: 11/18/21  7:09 PM   Result Value Ref Range    Valproic acid 64   mg/L            Consults:   No consultations were requested during this admission         Hospital Course:   Kylie \"Moncho\" KRISHNA Meier was admitted to Station 10  with attending Mingo Bella MD as a voluntary patient. The patient was placed under status 15 (15 minute checks) to ensure patient safety. Labs obtained as above. She had been started in olanzapine and Depakote on [facility] unit by Dr. Jennings prior to transfer to [address] and these were continued. Keflex was also continued until course was completed for UTI. She requested transfer to another unit due verbal altercation with another peer which was likely prompted by paranoia.     Moncho did not participate in groups and was more visible in the " milieu as stabilization progressed.  The patient's symptoms of suicidal ideation and command AH improved. She continued to have some guardedness and paranoid but also improved, requested to discharge back to shelter of [facility] evening of 11/17, agreed to remain until morning to be assessed by Dr Padilla who assumed care on 11/18.     Today Kylie Meier reports having no thoughts of harming self or others and denies having further command auditory hallucinations. In addition, she has notable risk factors for self-harm, including age, single status, psychosis, substance abuse and previous suicide attempts. However, risk is mitigated by commitment to family, patient has been future oriented, ability to volunteer a safety plan and history of seeking help when needed. Therefore, based on all available evidence including the factors cited above, she does not appear to be at imminent risk for self-harm, does not meet criteria for a 72-hr hold, and therefore remains appropriate for ongoing outpatient level of care. She agreed to remain on unit until VPA level could be drawn to ensure not supratherapeutic. VPA level prior to discharge was 64. She took a cab to [facility].     Kylie Meier was discharged to [facility]. At the time of discharge Kylie Meier was determined to not be a danger to herself or others.          Discharge Medications:     Discharge Medication List as of 11/18/2021  4:41 PM      CONTINUE these medications which have CHANGED    Details   divalproex sodium extended-release (DEPAKOTE ER) 500 MG 24 hr tablet Take 2 tablets (1,000 mg) by mouth daily, Disp-60 tablet, R-0, E-Prescribe      OLANZapine (ZYPREXA) 10 MG tablet Take 1 tablet (10 mg) by mouth 2 times daily, Disp-60 tablet, R-0, E-Prescribe         STOP taking these medications       cephALEXin (KEFLEX) 500 MG capsule Comments:   Reason for Stopping:                    Psychiatric Examination:   Appearance:  " awake, alert and adequately groomed  Attitude:  cooperative  Eye Contact:  fair  Mood:  \"better\"  Affect:  appropriate and in normal range and mood congruent  Speech:  clear, coherent and normal prosody  Psychomotor Behavior:  no evidence of tardive dyskinesia, dystonia, or tics  Thought Process:  goal oriented  Associations:  no loose associations  Thought Content:  no evidence of suicidal ideation or homicidal ideation, continues to appear paranoid but less so than earlier in admission, denies AVH, not observed responding to internal stimuli during interview  Insight:  partial  Judgment:  fair, adequate for safety   Oriented to:  time, person, and place  Attention Span and Concentration:  intact  Recent and Remote Memory:  intact  Language: English, fluent  Fund of Knowledge: appropriate  Muscle Strength and Tone: normal  Gait and Station: Normal         Discharge Plan:   Health Care Follow-up:   You came in without health  insurance. You met with a Financial Counselor who was trying to help you get insurance but you gave them conflicting information.  You do not have any medical records in the system.        You are returning to:  [address] [address]  Main # for the shelter is [phone]     [facility] is [facility] on the 2nd floor of the [facility].  [phone]  You can call this number and speak t the , Isabelle Oviedo, about helping you get services you need.        Participate in your screening phone call with Brenda Beltrán for psychiatric medication management services on Monday, November 22, 2021 @3PM.  [facility]  Human Services Building  [address] [place] [address]  General Information: [phone]  Appointments: [phone]  Nurse Line: [phone]  If you need to get in earlier, call the nurse line - [phone].  The Health Unit Coordinator has faxed these " discharge instructions have been faxed to fax: [phone]        Attend all scheduled appointments with your outpatient providers. Call at least 24 hours in advance if you need to reschedule an appointment to ensure continued access to your outpatient providers.        Attestation:  Patient has been seen and evaluated by me, Mona Padilla DO on day of discharge. 40 minutes were spent in coordination of discharge planning.

## 2021-11-18 NOTE — PLAN OF CARE
Assessment/Intervention/Current Symtoms and Care Coordination  -Chart review  -Pre round meeting with team  -Rounded with team, addressed patient needs/concerns  -Post round meeting with team  Current Symptoms include the following: Psychosis      Plan is to go tonight after lab at 7PM.  She will call mother to see if she will pick her up.    I met with the pt. She was in bed in the dark at 11:30AM with a very loud sound machine going. She was pleasant and cooperative.  She asked if she could get a cab.and I agreed to this.  I explained that she can go after 7PM lab draw.  I explained that the doctor wants her to have a psychiatry appointment and she agrees to this.  I asked her about getting health insurance and she said she is working with the [facility] on this. She gives me the address of [address] [place] [address]. I called intake and gave them this address.  She said she has been there for a year. I said there are no health care records. Her response was unclear.      [facility]  Human Services Building  [address] [place] [address]  General Information: [phone]  Appointments: [phone]  Nurse Line: [phone]  If you need to get in earlier, call the nurse line - [phone].  The Health Unit Coordinator has faxed these discharge instructions have been faxed to fax: [phone]  I called at 1:15PM and they said I should call back after 2PM.    [facility]  [facility]    This clinic provide a variety of health and wellness services to people who are homeless. It operates at 8 different shelters and drop-in centers in [place], to address health concerns, provide treatment including medications, coordinate health care services, provide substance use disorder and mental health services, provide health education, and coordinate access to health and  in the community. Any homeless adult  or child is eligible. This includes those living outside or on the street, in shelter or transitional housing, with friends or relatives or who have been homeless within the past year.  Clinic hours  Hours vary by location. See listings below.  Appointments  Appointments aren't required, but we recommend calling to confirm the clinic is open. You can call the individual clinic (see list below) or the main phone number: [phone]. If you know a homeless person who needs medical care, have them call [phone] or direct them to a [facility] clinic site listed below.  I called back and there is much confusion about this pt's demographic information and she may be giving out conflicting information to many agencies. Her social security number does not come up.  They scheduled the phone screening for Monday, November 22, 2021 at 3PM.    [address],   Main # for the Thursday, , 1 - 8 p.m.        [phone]  I called the main number and they said the clinic is on a different floor and she did not know the phone #. She was bringing my phone # to this floor to have them return my call. Chela called me back and said they only have primary care services there.  I called the main [facility] # and picked the mental health option and left a vm for Isabelle Oviedo asking if there was a psychiatric provider available through [facility].      Discharge Plan or Goal  Health Care Follow-up:   You came in without health  insurance. You met with a Financial Counselor who was trying to help you get insurance but you gave them conflicting information.  You do not have any medical records in the system.      You are returning to:  [address] [address]  Main # for the shelter is [phone]    There is a [facility] on the 2nd floor of the [facility].  [phone]  You can call this number  and speak t the , Isabelle Oviedo, about helping you get services you need.      Participate in your screening phone call with Brenda Beltrán for psychiatric medication management services on Monday, November 22, 2021 @3PM.  [facility]  Human Services Building  [address] [place] [address]  General Information: [phone]  Appointments: [phone]  Nurse Line: [phone]  If you need to get in earlier, call the nurse line - [phone].  The Health Unit Coordinator has faxed these discharge instructions have been faxed to fax: [phone]      Barriers to Discharge  Pt leaving [facility], needs lab draw first        Referral Status  Psychiatry - pt does not have insurance, will call the mental health cneter          Legal Status  Patient is voluntary

## 2021-11-18 NOTE — PLAN OF CARE
Problem: Behavioral Health Plan of Care  Goal: Plan of Care Review  Recent Flowsheet Documentation  Taken 11/18/2021 0900 by Cherelle Ramírez RN  Plan of Care Reviewed With: patient  Patient Agreement with Plan of Care: agrees     Patient is observed in her room and in the doorway of her room.  Affect is full ranged.  Speech is clear and coherent.  Thoughts are logical.  Mood is calm.  Patient denies thoughts of SI, SIB, HI and hallucinations.  Denies depression and anxiety.  Patients appetite is appropriate and she is medication compliant.  Patient request the temp or her room is increased and extra blankets which are given.  Patient denies any further questions or concerns at this time.  Continue with plan of care.

## 2021-11-18 NOTE — PLAN OF CARE
"Patient presents with a full range of affect. Mood presents as calm. Is pleasant, polite, and cooperative. Speech is clear. Eye contact is good. Patient denies depression, anxiety, suicidal ideation, SIB, and homicidal ideation. Denies auditory/visual hallucinations yet appears to be responding; AEB, looking off into space and answering an imaginary person or persons. No medical issues noted. Vital signs stable. Medication compliant. Patient requested and received PRN Hydroxyzine x1 later in this shift for complaint of anxiety. Patient requested and received PRN Trazodone for sleep. Did not attend group this shift. Ate eat evening meal. Patient out in the milieu occasionally this shift. No interaction with peers noted.     /73   Pulse 89   Temp 98  F (36.7  C) (Skin)   Resp 16   Ht 1.575 m (5' 2\")   Wt 72.6 kg (160 lb)   SpO2 100%   BMI 29.26 kg/m       A second cell phone was found in patient's room this shift. Cell phone was removed and placed in patient's locker in the property room.     This shift patient was requesting to sign a 12 Hour Intent to leave. On-call provider notified. Patient was encouraged to stay overnight and speak with Dr. Padilla in the morning. Patient agreed to stay. Did not sign paperwork.      "